# Patient Record
Sex: FEMALE | Race: BLACK OR AFRICAN AMERICAN | NOT HISPANIC OR LATINO
[De-identification: names, ages, dates, MRNs, and addresses within clinical notes are randomized per-mention and may not be internally consistent; named-entity substitution may affect disease eponyms.]

---

## 2020-01-29 ENCOUNTER — RESULT REVIEW (OUTPATIENT)
Age: 35
End: 2020-01-29

## 2021-03-30 ENCOUNTER — RESULT REVIEW (OUTPATIENT)
Age: 36
End: 2021-03-30

## 2021-07-26 ENCOUNTER — INPATIENT (INPATIENT)
Facility: HOSPITAL | Age: 36
LOS: 1 days | Discharge: HOME | End: 2021-07-28
Attending: HOSPITALIST | Admitting: HOSPITALIST
Payer: COMMERCIAL

## 2021-07-26 VITALS
TEMPERATURE: 98 F | SYSTOLIC BLOOD PRESSURE: 125 MMHG | RESPIRATION RATE: 20 BRPM | OXYGEN SATURATION: 100 % | DIASTOLIC BLOOD PRESSURE: 66 MMHG | HEART RATE: 84 BPM

## 2021-07-26 DIAGNOSIS — L03.211 CELLULITIS OF FACE: ICD-10-CM

## 2021-07-26 LAB
ALBUMIN SERPL ELPH-MCNC: 4.5 G/DL — SIGNIFICANT CHANGE UP (ref 3.3–5)
ALP SERPL-CCNC: 76 U/L — SIGNIFICANT CHANGE UP (ref 40–120)
ALT FLD-CCNC: 80 U/L — HIGH (ref 4–33)
ANION GAP SERPL CALC-SCNC: 17 MMOL/L — HIGH (ref 7–14)
AST SERPL-CCNC: 30 U/L — SIGNIFICANT CHANGE UP (ref 4–32)
BILIRUB SERPL-MCNC: 0.4 MG/DL — SIGNIFICANT CHANGE UP (ref 0.2–1.2)
BLD GP AB SCN SERPL QL: NEGATIVE — SIGNIFICANT CHANGE UP
BUN SERPL-MCNC: 5 MG/DL — LOW (ref 7–23)
CALCIUM SERPL-MCNC: 9.9 MG/DL — SIGNIFICANT CHANGE UP (ref 8.4–10.5)
CHLORIDE SERPL-SCNC: 98 MMOL/L — SIGNIFICANT CHANGE UP (ref 98–107)
CO2 SERPL-SCNC: 20 MMOL/L — LOW (ref 22–31)
CREAT SERPL-MCNC: 0.58 MG/DL — SIGNIFICANT CHANGE UP (ref 0.5–1.3)
GLUCOSE SERPL-MCNC: 102 MG/DL — HIGH (ref 70–99)
HCG SERPL-ACNC: SIGNIFICANT CHANGE UP MIU/ML
HCT VFR BLD CALC: 34.2 % — LOW (ref 34.5–45)
HGB BLD-MCNC: 11.6 G/DL — SIGNIFICANT CHANGE UP (ref 11.5–15.5)
MCHC RBC-ENTMCNC: 28.4 PG — SIGNIFICANT CHANGE UP (ref 27–34)
MCHC RBC-ENTMCNC: 33.9 GM/DL — SIGNIFICANT CHANGE UP (ref 32–36)
MCV RBC AUTO: 83.8 FL — SIGNIFICANT CHANGE UP (ref 80–100)
NRBC # BLD: 0 /100 WBCS — SIGNIFICANT CHANGE UP
NRBC # FLD: 0 K/UL — SIGNIFICANT CHANGE UP
PLATELET # BLD AUTO: 395 K/UL — SIGNIFICANT CHANGE UP (ref 150–400)
POTASSIUM SERPL-MCNC: 4.4 MMOL/L — SIGNIFICANT CHANGE UP (ref 3.5–5.3)
POTASSIUM SERPL-SCNC: 4.4 MMOL/L — SIGNIFICANT CHANGE UP (ref 3.5–5.3)
PROT SERPL-MCNC: 8.3 G/DL — SIGNIFICANT CHANGE UP (ref 6–8.3)
RBC # BLD: 4.08 M/UL — SIGNIFICANT CHANGE UP (ref 3.8–5.2)
RBC # FLD: 14.3 % — SIGNIFICANT CHANGE UP (ref 10.3–14.5)
RH IG SCN BLD-IMP: POSITIVE — SIGNIFICANT CHANGE UP
SODIUM SERPL-SCNC: 135 MMOL/L — SIGNIFICANT CHANGE UP (ref 135–145)
WBC # BLD: 10.98 K/UL — HIGH (ref 3.8–10.5)
WBC # FLD AUTO: 10.98 K/UL — HIGH (ref 3.8–10.5)

## 2021-07-26 PROCEDURE — 99285 EMERGENCY DEPT VISIT HI MDM: CPT

## 2021-07-26 PROCEDURE — 99223 1ST HOSP IP/OBS HIGH 75: CPT

## 2021-07-26 PROCEDURE — 70487 CT MAXILLOFACIAL W/DYE: CPT | Mod: 26

## 2021-07-26 RX ORDER — FOLIC ACID 0.8 MG
1 TABLET ORAL DAILY
Refills: 0 | Status: DISCONTINUED | OUTPATIENT
Start: 2021-07-26 | End: 2021-07-28

## 2021-07-26 RX ORDER — ACETAMINOPHEN 500 MG
1000 TABLET ORAL ONCE
Refills: 0 | Status: COMPLETED | OUTPATIENT
Start: 2021-07-26 | End: 2021-07-26

## 2021-07-26 RX ORDER — ACETAMINOPHEN 500 MG
650 TABLET ORAL EVERY 6 HOURS
Refills: 0 | Status: DISCONTINUED | OUTPATIENT
Start: 2021-07-26 | End: 2021-07-28

## 2021-07-26 RX ADMIN — Medication 100 MILLIGRAM(S): at 22:11

## 2021-07-26 RX ADMIN — Medication 400 MILLIGRAM(S): at 22:11

## 2021-07-26 NOTE — H&P ADULT - PROBLEM SELECTOR PLAN 2
Assessment:  - hx of pregnancy, planned, 5 weeks gestation  - has outpatient OBGYN appt planned today however cannot make it due to ED admission  - no hx or PE findings of sexually transmitted infections    Plan:  - will send HIV test, patient agreed  - will send UA to r/o asymptomatic bacteruria in the setting of pregnancy  - will obtain pelvic ultrasound to evaluate pregnancy status  - OBGYN consult for further recommendations  - will start folic acid and multivitamin for pregnancy status  - avoid teratogenic medications

## 2021-07-26 NOTE — H&P ADULT - PROBLEM SELECTOR PLAN 1
Assessment:  - patient with hx of dental procedure presented for new onset facial swelling for 1 week, concerning for abscess  - CT scan shows Extensive LEFT sided facial cellulitis with edema within the LEFT buccal fat, thickening of the LEFT platysma and masseter muscle, infected L molar  - WBc 10.98, mildly elevated  - patient currently nontoxic appearing, comfortable at bedside    Plan:  - OMFS surgery consult - no surgical intervention, start IV abx  - start IV clindamycin 600mg Q8hr. I spoke with pharmacy in regards to pregnancy status. Clindamycin is not a teratogenic and is safe for pregnancy. Patient is allergic to penicillins (gets a rash and hives as per patient)  - monitor for improvement Assessment:  - patient with hx of dental procedure presented for new onset facial swelling for 1 week, concerning for abscess  - CT scan shows Extensive LEFT sided facial cellulitis with edema within the LEFT buccal fat, thickening of the LEFT platysma and masseter muscle, infected L molar  - WBc 10.98, mildly elevated  - patient currently nontoxic appearing, comfortable at bedside    Plan:  - OMFS surgery consult - no surgical intervention, start IV abx  - start IV clindamycin 600mg Q8hr. I spoke with pharmacy in regards to pregnancy status. Clindamycin is not a teratogenic and is safe for pregnancy. Patient is allergic to penicillins (gets a rash and hives as per patient)  - monitor for improvement  - copy of the referral note from the outpatient dentist placed in the chart

## 2021-07-26 NOTE — H&P ADULT - ASSESSMENT
This is a 35 y/o F, 5 week pregnant, presented for facial cellulitis after dental procedure. Needs IV abx. Will admit for facial cellulitis

## 2021-07-26 NOTE — ED PROVIDER NOTE - OBJECTIVE STATEMENT
36 year old female presents with left facial swelling. patient had wisdom tooth removal on 7/14.  since then patient has developed left sided facial swelling. on clinda with no relief. also found to be newly pregnant. unable to open mouth. able to swallow liquids. no other complaints

## 2021-07-26 NOTE — ED ADULT NURSE NOTE - OBJECTIVE STATEMENT
37 y/o female presents to ED complaining of left sided dental abscess. Pt a&ox4, endorses getting 6 teeth pulled on 7/14, has been taking abx but swelling did not subside. Pt reports pain to L side of mouth and endorses going to dentist but was unable to get abscess drained due to pain, pt was unable to open her mouth. Denies difficulty breathing/swallowing. Denies fever, chills. 20g IV placed to LAC. Labs obtained and medicated as ordered. Will monitor.

## 2021-07-26 NOTE — H&P ADULT - HISTORY OF PRESENT ILLNESS
This is a 35 y/o F with pmhx of recent pregnancy (5 weeks) presented to the ED for L face swelling. The patient had tooth removal on the L side done on the 7/14/2021, then developed L facial swelling, pain and difficulty opening the mouth in the that area. Was given clindamycin after the procedure. The patient went to follow up with her outpatient dentist today and on exam, she found there was a possible submasseteric abscess for which it could not be drained in the office due to pain, and limited buccal space. The patient denies any symptoms of respiratory compromise. The patient is also 5 weeks pregnant, found out on the 17th, planned pregnancy. She otherwise denies fevers, chills, chest pain, abdominal pain. She denies dysuria, vaginal discharge. Denies difficulty swallowing however endorses difficulty eating due to pain and inability to open her mouth completely.

## 2021-07-26 NOTE — H&P ADULT - NSHPLABSRESULTS_GEN_ALL_CORE
11.6   10.98 )-----------( 395      ( 26 Jul 2021 17:48 )             34.2       07-26    135  |  98  |  5<L>  ----------------------------<  102<H>  4.4   |  20<L>  |  0.58    Ca    9.9      26 Jul 2021 17:48    TPro  8.3  /  Alb  4.5  /  TBili  0.4  /  DBili  x   /  AST  30  /  ALT  80<H>  /  AlkPhos  76  07-26

## 2021-07-26 NOTE — PROGRESS NOTE ADULT - SUBJECTIVE AND OBJECTIVE BOX
SUBJECTIVE:    37 y/o female 2 weeks s/p serial extractions presents to the ED with L sided facial swelling and chief complaint of swelling and trismus that has been developing for the past week. Patient is 5 weeks pregnant.    PAST MEDICAL & SURGICAL HISTORY: None reported    MEDICATIONS: None reported    Allergies: penicillins (Unknown)    CBC Full  -  ( 26 Jul 2021 17:48 )  WBC Count : 10.98 K/uL  RBC Count : 4.08 M/uL  Hemoglobin : 11.6 g/dL  Hematocrit : 34.2 %  Platelet Count - Automated : 395 K/uL  Mean Cell Volume : 83.8 fL  Mean Cell Hemoglobin : 28.4 pg  Mean Cell Hemoglobin Concentration : 33.9 gm/dL  Auto Neutrophil # : x  Auto Lymphocyte # : x  Auto Monocyte # : x  Auto Eosinophil # : x  Auto Basophil # : x  Auto Neutrophil % : x  Auto Lymphocyte % : x  Auto Monocyte % : x  Auto Eosinophil % : x  Auto Basophil % : x    07-26    135  |  98  |  5<L>  ----------------------------<  102<H>  4.4   |  20<L>  |  0.58    Ca    9.9      26 Jul 2021 17:48    TPro  8.3  /  Alb  4.5  /  TBili  0.4  /  DBili  x   /  AST  30  /  ALT  80<H>  /  AlkPhos  76  07-26    Radiology:    IMPRESSION:  Extensive LEFT sided facial cellulitis with edema within the LEFT buccal fat, thickening of the LEFT platysma and masseter muscle. Fluid seen in several empty sockets within the mandible and maxilla. There is breakthrough of lateral LEFT mandibular cortex about the socket of the first LEFT mandibular molar which may be infected. No overlying abscess is seen. Reactive level 1 lymph nodes are noted.    OBJECTIVE:    Physical exam:  Gen: AAOx4. NAD. Patient experiencing no dyspnea, dysphagia.  Head: NC, asymmetric with L lower1/3 facial edema. Inferior border of the mandible palpable on right, not palpable on left.   Eyes: PERRLA, EOMI.  Ears: EACs patent w/o discharge.  Nose: Nares patent w/o discharge.  Neck: Right side soft and supple, left side tender to palpation   IOE: Patient experiencing significant trismus with VANCE of ~15 mm. Firm swelling in the buccal vestibule extending to the buccal mucosa. FOM soft and not raised.  Neuro: CN II-XII grossly intact

## 2021-07-26 NOTE — H&P ADULT - NSHPPHYSICALEXAM_GEN_ALL_CORE
PHYSICAL EXAM:  GENERAL: NAD, well-developed, well-nourished  HEAD:  Atraumatic, Normocephalic  EYES: EOMI, PERRL, conjunctiva and sclera clear  NECK: Supple, No JVD, + facial swelling on the L side, swelling of the buccal area with fluctuance  CHEST/LUNG: Clear to auscultation bilaterally; No wheezes, rales or rhonchi  HEART: Regular rate and rhythm; No murmurs, rubs, or gallops, (+)S1, S2  ABDOMEN: Soft, Nontender, Nondistended; Normal Bowel sounds   EXTREMITIES:  2+ Peripheral Pulses, No clubbing, cyanosis, or edema  PSYCH: normal mood and affect  NEUROLOGY: AAOx3, non-focal  SKIN: No rashes or lesions

## 2021-07-26 NOTE — PROGRESS NOTE ADULT - ASSESSMENT
35 y/o patient presents with left sided facial swelling 2 weeks s/p extractions. No acute abscess noted on CT.    Recommendations:   - No surgical intervention from OMFS standpoint   - IV clindamycin and monitor overnight  - Pain control per primary  - Follow up with external oral surgeon    Oral and Maxillofacial Surgery   [id number]

## 2021-07-26 NOTE — H&P ADULT - NSHPREVIEWOFSYSTEMS_GEN_ALL_CORE
REVIEW OF SYSTEMS:    CONSTITUTIONAL: No weakness, fevers or chills  EYES/ENT: No visual changes;  No dysphagia; No sore throat; No rhinorrhea; No sinus pain/pressure, + facial pain and swelling on the L  NECK: No pain or stiffness  RESPIRATORY: No cough, wheezing, hemoptysis; No shortness of breath  CARDIOVASCULAR: No chest pain or palpitations; No lower extremity edema  GASTROINTESTINAL: No abdominal or epigastric pain. No nausea, vomiting, or hematemesis; No diarrhea or constipation. No melena or hematochezia.  GENITOURINARY: No dysuria, frequency or hematuria  NEUROLOGICAL: No numbness or weakness  MSK: ambulates without assistance  SKIN: No itching, burning, rashes, or lesions   All other review of systems is negative unless indicated above.

## 2021-07-26 NOTE — ED PROVIDER NOTE - ENMT, MLM
Airway patent, Nasal mucosa clear. Mouth with normal mucosa. left sided facial swelling. unable to open mouth to complete exam

## 2021-07-27 ENCOUNTER — TRANSCRIPTION ENCOUNTER (OUTPATIENT)
Age: 36
End: 2021-07-27

## 2021-07-27 DIAGNOSIS — Z29.9 ENCOUNTER FOR PROPHYLACTIC MEASURES, UNSPECIFIED: ICD-10-CM

## 2021-07-27 DIAGNOSIS — Z3A.01 LESS THAN 8 WEEKS GESTATION OF PREGNANCY: ICD-10-CM

## 2021-07-27 DIAGNOSIS — L03.211 CELLULITIS OF FACE: ICD-10-CM

## 2021-07-27 DIAGNOSIS — Z34.90 ENCOUNTER FOR SUPERVISION OF NORMAL PREGNANCY, UNSPECIFIED, UNSPECIFIED TRIMESTER: ICD-10-CM

## 2021-07-27 LAB
ALBUMIN SERPL ELPH-MCNC: 4.3 G/DL — SIGNIFICANT CHANGE UP (ref 3.3–5)
ALP SERPL-CCNC: 77 U/L — SIGNIFICANT CHANGE UP (ref 40–120)
ALT FLD-CCNC: 70 U/L — HIGH (ref 4–33)
ANION GAP SERPL CALC-SCNC: 12 MMOL/L — SIGNIFICANT CHANGE UP (ref 7–14)
AST SERPL-CCNC: 22 U/L — SIGNIFICANT CHANGE UP (ref 4–32)
BASOPHILS # BLD AUTO: 0.02 K/UL — SIGNIFICANT CHANGE UP (ref 0–0.2)
BASOPHILS NFR BLD AUTO: 0.2 % — SIGNIFICANT CHANGE UP (ref 0–2)
BILIRUB SERPL-MCNC: 0.6 MG/DL — SIGNIFICANT CHANGE UP (ref 0.2–1.2)
BUN SERPL-MCNC: 4 MG/DL — LOW (ref 7–23)
CALCIUM SERPL-MCNC: 9.5 MG/DL — SIGNIFICANT CHANGE UP (ref 8.4–10.5)
CHLORIDE SERPL-SCNC: 103 MMOL/L — SIGNIFICANT CHANGE UP (ref 98–107)
CO2 SERPL-SCNC: 20 MMOL/L — LOW (ref 22–31)
CREAT SERPL-MCNC: 0.53 MG/DL — SIGNIFICANT CHANGE UP (ref 0.5–1.3)
EOSINOPHIL # BLD AUTO: 0.1 K/UL — SIGNIFICANT CHANGE UP (ref 0–0.5)
EOSINOPHIL NFR BLD AUTO: 1.1 % — SIGNIFICANT CHANGE UP (ref 0–6)
GLUCOSE SERPL-MCNC: 100 MG/DL — HIGH (ref 70–99)
HCT VFR BLD CALC: 30.4 % — LOW (ref 34.5–45)
HGB BLD-MCNC: 10.5 G/DL — LOW (ref 11.5–15.5)
HIV 1+2 AB+HIV1 P24 AG SERPL QL IA: SIGNIFICANT CHANGE UP
IANC: 6.81 K/UL — SIGNIFICANT CHANGE UP (ref 1.5–8.5)
IMM GRANULOCYTES NFR BLD AUTO: 0.4 % — SIGNIFICANT CHANGE UP (ref 0–1.5)
LYMPHOCYTES # BLD AUTO: 1.06 K/UL — SIGNIFICANT CHANGE UP (ref 1–3.3)
LYMPHOCYTES # BLD AUTO: 11.6 % — LOW (ref 13–44)
MAGNESIUM SERPL-MCNC: 2.1 MG/DL — SIGNIFICANT CHANGE UP (ref 1.6–2.6)
MCHC RBC-ENTMCNC: 28.8 PG — SIGNIFICANT CHANGE UP (ref 27–34)
MCHC RBC-ENTMCNC: 34.5 GM/DL — SIGNIFICANT CHANGE UP (ref 32–36)
MCV RBC AUTO: 83.3 FL — SIGNIFICANT CHANGE UP (ref 80–100)
MONOCYTES # BLD AUTO: 1.09 K/UL — HIGH (ref 0–0.9)
MONOCYTES NFR BLD AUTO: 12 % — SIGNIFICANT CHANGE UP (ref 2–14)
NEUTROPHILS # BLD AUTO: 6.81 K/UL — SIGNIFICANT CHANGE UP (ref 1.8–7.4)
NEUTROPHILS NFR BLD AUTO: 74.7 % — SIGNIFICANT CHANGE UP (ref 43–77)
NRBC # BLD: 0 /100 WBCS — SIGNIFICANT CHANGE UP
NRBC # FLD: 0 K/UL — SIGNIFICANT CHANGE UP
PHOSPHATE SERPL-MCNC: 3.2 MG/DL — SIGNIFICANT CHANGE UP (ref 2.5–4.5)
PLATELET # BLD AUTO: 376 K/UL — SIGNIFICANT CHANGE UP (ref 150–400)
POTASSIUM SERPL-MCNC: 3.9 MMOL/L — SIGNIFICANT CHANGE UP (ref 3.5–5.3)
POTASSIUM SERPL-SCNC: 3.9 MMOL/L — SIGNIFICANT CHANGE UP (ref 3.5–5.3)
PROT SERPL-MCNC: 8.1 G/DL — SIGNIFICANT CHANGE UP (ref 6–8.3)
RBC # BLD: 3.65 M/UL — LOW (ref 3.8–5.2)
RBC # FLD: 14.2 % — SIGNIFICANT CHANGE UP (ref 10.3–14.5)
SARS-COV-2 RNA SPEC QL NAA+PROBE: SIGNIFICANT CHANGE UP
SODIUM SERPL-SCNC: 135 MMOL/L — SIGNIFICANT CHANGE UP (ref 135–145)
WBC # BLD: 9.12 K/UL — SIGNIFICANT CHANGE UP (ref 3.8–10.5)
WBC # FLD AUTO: 9.12 K/UL — SIGNIFICANT CHANGE UP (ref 3.8–10.5)

## 2021-07-27 PROCEDURE — 76830 TRANSVAGINAL US NON-OB: CPT | Mod: 26

## 2021-07-27 PROCEDURE — 99233 SBSQ HOSP IP/OBS HIGH 50: CPT | Mod: GC

## 2021-07-27 RX ORDER — DIPHENHYDRAMINE HYDROCHLORIDE AND LIDOCAINE HYDROCHLORIDE AND ALUMINUM HYDROXIDE AND MAGNESIUM HYDRO
15 KIT
Refills: 0 | Status: DISCONTINUED | OUTPATIENT
Start: 2021-07-27 | End: 2021-07-28

## 2021-07-27 RX ORDER — LACTOBACILLUS ACIDOPHILUS 100MM CELL
1 CAPSULE ORAL DAILY
Refills: 0 | Status: DISCONTINUED | OUTPATIENT
Start: 2021-07-27 | End: 2021-07-28

## 2021-07-27 RX ADMIN — Medication 1 TABLET(S): at 12:15

## 2021-07-27 RX ADMIN — Medication 100 MILLIGRAM(S): at 21:57

## 2021-07-27 RX ADMIN — Medication 100 MILLIGRAM(S): at 15:25

## 2021-07-27 RX ADMIN — DIPHENHYDRAMINE HYDROCHLORIDE AND LIDOCAINE HYDROCHLORIDE AND ALUMINUM HYDROXIDE AND MAGNESIUM HYDRO 15 MILLILITER(S): KIT at 12:15

## 2021-07-27 RX ADMIN — Medication 650 MILLIGRAM(S): at 09:26

## 2021-07-27 RX ADMIN — Medication 1 MILLIGRAM(S): at 12:15

## 2021-07-27 RX ADMIN — Medication 650 MILLIGRAM(S): at 11:38

## 2021-07-27 RX ADMIN — Medication 650 MILLIGRAM(S): at 00:41

## 2021-07-27 RX ADMIN — Medication 100 MILLIGRAM(S): at 05:01

## 2021-07-27 RX ADMIN — DIPHENHYDRAMINE HYDROCHLORIDE AND LIDOCAINE HYDROCHLORIDE AND ALUMINUM HYDROXIDE AND MAGNESIUM HYDRO 15 MILLILITER(S): KIT at 18:56

## 2021-07-27 RX ADMIN — Medication 650 MILLIGRAM(S): at 02:25

## 2021-07-27 RX ADMIN — DIPHENHYDRAMINE HYDROCHLORIDE AND LIDOCAINE HYDROCHLORIDE AND ALUMINUM HYDROXIDE AND MAGNESIUM HYDRO 15 MILLILITER(S): KIT at 21:57

## 2021-07-27 NOTE — DISCHARGE NOTE PROVIDER - PROVIDER TOKENS
FREE:[LAST:[Roman],FIRST:[Yaima],PHONE:[(   )    -],FAX:[(   )    -],ADDRESS:[[facility] at [address] [place] [address] on Monday 08/02/21 at 7:45 pm]],FREE:[LAST:[Janine],FIRST:[Neli],PHONE:[(   )    -],FAX:[(   )    -],ADDRESS:[[facility] [place] [address] on Friday 7/30/21 at 9:00 am.]]

## 2021-07-27 NOTE — DISCHARGE NOTE PROVIDER - CARE PROVIDER_API CALL
Yaima Owens at [address], [place] [address] on Monday 08/02/21 at 7:45 pm  Phone: (   )    -  Fax: (   )    -  Follow Up Time:     Neli Mehta  [address] [place] [address] on Friday 7/30/21 at 9:00 am.  Phone: (   )    -  Fax: (   )    -  Follow Up Time:

## 2021-07-27 NOTE — DISCHARGE NOTE PROVIDER - NSDCCPCAREPLAN_GEN_ALL_CORE_FT
PRINCIPAL DISCHARGE DIAGNOSIS  Diagnosis: Facial cellulitis  Assessment and Plan of Treatment: When you came to the hospital, you were complaining of left facial swelling and pain. You were found to have cellulitis, which is a common infection of the skin that can occur in many areas on the body, including the face. You were treated with IV antibiotics which were noted to decrease the swelling and help alleviate your pain. Your pain was also well controlled with Tylenol and Magic Mouthwash. NSAIDs were avoided given that you are pregnant, and NSAIDs can harm developing babies. Please take Clindamycin 450mg three times a day until completion of the course of antibiotics.   We recommend you take tylenol 650mg every 6 hours as needed for pain. Please follow-up with your Oral Surgeon, Dr. Mehta, on Friday 07/30/21 at 9:00am.   Return to the Emergency Department if you experience recurrent vomiting, fevers greater than 100.4F, increase in area of redness, warmth around the area, foul smelling discharge from the area, increased tenderness around the area, or any other concerning symptoms. Also, if you experience worsening watery diarrhea, please seek out medical care immediately.         SECONDARY DISCHARGE DIAGNOSES  Diagnosis: Currently pregnant  Assessment and Plan of Treatment: When you came to the hospital, you had informed the physicians that you were pregnant. An ultrasound performed in the hospital noted an intrauterine gestation of 6 weeks and 2 days on 07/27/21. The full results of this ultrasound are documented later in this document. You were not given any medications that could harm your developing baby. Please avoid NSAIDs, such as Advil or Aleve, as these can harm a developing baby. Please continue to take your vitamins. Follow-up with your OBGYN on 08/02/21 at 7:45pm. If you experience worsening abdominal pain or vaginal bleeding, please seek medical care immediately.

## 2021-07-27 NOTE — PROGRESS NOTE ADULT - ATTENDING COMMENTS
36yr old woman currently 5 weeks pregnant with no significant PMH p/w left face swelling and pain after dental extraction. Admitted with left facial cellulitis.    left face with induration and swelling with mild TTP  Patient unable to open mouth more than 2 cm, no gross abnormalities seen    CT Maxillofacial with no abscess on imaging. No need for any intervention per OMFS  C/w IV Clindamycin, Tylenol PRN for pain control, ice pack to left face  F/U US TV  Switch to soft diet

## 2021-07-27 NOTE — PROGRESS NOTE ADULT - SUBJECTIVE AND OBJECTIVE BOX
Siddhartha Wilkerson MS4  Internal Medicine  Pager #[phone]    Patient is a 36y old  Female who presents with a chief complaint of Left facial swelling (26 Jul 2021 20:59)      SUBJECTIVE / OVERNIGHT EVENTS:  Patient seen and evaluated in ED. No acute events overnight. Complains of dull 6/10 L sided facial pain in mandibular region, becomes 8/10 when opening jaw; although significantly improved following 650mg oral Tylenol. No vaginal discharge/bleeding. No fevers/chills. Denies SOB at rest, chest pain, palpitations, abdominal pain, nausea/vomiting. Denies difficulty swallowing.     ADDITIONAL REVIEW OF SYSTEMS:    CONSTITUTIONAL: No weakness, fevers or chills  EYES/ENT: + L sided facial swelling in mandibular region. Worsened by opening jaw. No visual changes;  No vertigo or throat pain.  NECK: No pain or stiffness.  RESPIRATORY: No cough, wheezing, hemoptysis; No shortness of breath.  CARDIOVASCULAR: No chest pain or palpitations  GASTROINTESTINAL: + constipation. no abdominal pain. No nausea, vomiting, diarrhea, or melena.  GENITOURINARY: No dysuria, frequency or hematuria  NEUROLOGICAL: No numbness or weakness  All other review of systems is negative unless indicated above.    MEDICATIONS  (STANDING):  clindamycin IVPB 600 milliGRAM(s) IV Intermittent every 8 hours  FIRST- Mouthwash  BLM 15 milliLiter(s) Swish and Spit four times a day  folic acid 1 milliGRAM(s) Oral daily  lactobacillus acidophilus 1 Tablet(s) Oral daily  multivitamin 1 Tablet(s) Oral daily    MEDICATIONS  (PRN):  acetaminophen   Tablet .. 650 milliGRAM(s) Oral every 6 hours PRN Mild Pain (1 - 3)      CAPILLARY BLOOD GLUCOSE        I&O's Summary      PHYSICAL EXAM:  Vital Signs Last 24 Hrs  T(C): 36.9 (27 Jul 2021 05:03), Max: 37.4 (26 Jul 2021 23:20)  T(F): 98.5 (27 Jul 2021 05:03), Max: 99.3 (26 Jul 2021 23:20)  HR: 80 (27 Jul 2021 10:27) (80 - 95)  BP: 124/70 (27 Jul 2021 10:27) (120/66 - 125/83)  BP(mean): --  RR: 18 (27 Jul 2021 10:27) (17 - 20)  SpO2: 100% (27 Jul 2021 10:27) (100% - 100%)    CONSTITUTIONAL: NAD, well-developed  HEENT: NC, asymmetric with L lower 1/3 facial edema. L lower 1/3 face tender to palpation, indurated, with no palpable fluid collections. Maximum opening of mouth ~15 mm. No pus noted on gross inspection of oropharynx.   RESPIRATORY: Normal respiratory effort; lungs are clear to auscultation bilaterally  CARDIOVASCULAR: Regular rate, normal S1 and S2, no murmur/rub/gallop; No lower extremity edema; Peripheral pulses are 2+ bilaterally  ABDOMEN: Nontender to palpation, normoactive bowel sounds, no rebound/guarding; No hepatosplenomegaly  MSK: no cyanosis of digits; no joint swelling or tenderness to palpation, full strength all extremities.  NEURO: No focal deficits, CN II-XII grossly intact b/l; sensation to light touch intact in all extremities.   PSYCH: A+O to person, place, and time; affect appropriate.    LABS:                        10.5   9.12  )-----------( 376      ( 27 Jul 2021 07:10 )             30.4     07-27    135  |  103  |  4<L>  ----------------------------<  100<H>  3.9   |  20<L>  |  0.53    Ca    9.5      27 Jul 2021 07:10  Phos  3.2     07-27  Mg     2.10     07-27    TPro  8.1  /  Alb  4.3  /  TBili  0.6  /  DBili  x   /  AST  22  /  ALT  70<H>  /  AlkPhos  77  07-27    HCG Quantitative, Serum: 97842.0:     HIV-1/2 Combo Result: Nonreact  (07.27.21 @ 07:10)      RADIOLOGY & ADDITIONAL TESTS:  Results Reviewed:   Imaging Personally Reviewed:   < from: CT Maxillofacial w/ IV Cont (07.26.21 @ 19:43) >  IMPRESSION:  Extensive LEFT sided facial cellulitis with edema within the LEFT buccal fat, thickening of the LEFT platysma and masseter muscle. Fluid seen in several empty sockets within the mandible and maxilla. There is breakthrough of lateral LEFT mandibular cortex about the socket of the first LEFT mandibularmolar which may be infected. No overlying abscess is seen. Reactive level 1 lymph nodes are noted.    < end of copied text >    COORDINATION OF CARE:  Care Discussed with Consultants/Other Providers [Y/N]: y  Prior or Outpatient Records Reviewed [Y/N]: y

## 2021-07-27 NOTE — DISCHARGE NOTE PROVIDER - HOSPITAL COURSE
This is a 37 y/o F with pmhx of recent pregnancy (5 weeks) presented to the ED for L face swelling. The patient had tooth removal on the L side done on the 7/14/2021, then developed L facial swelling, pain and difficulty opening the mouth in the that area. Was given clindamycin after the procedure. The patient went to follow up with her outpatient dentist today and on exam, she found there was a possible submasseteric abscess for which it could not be drained in the office due to pain, and limited buccal space. The patient denies any symptoms of respiratory compromise. The patient is also 5 weeks pregnant, found out on the 17th, planned pregnancy. She otherwise denies fevers, chills, chest pain, abdominal pain. She denies dysuria, vaginal discharge. Denies difficulty swallowing however endorses difficulty eating due to pain and inability to open her mouth completely.    In the ED - VS: T 36.7, /66, HR 84, RR 20, SpO2 100% on RA.  CT OMFS IV con revealed Extensive LEFT sided facial cellulitis with edema within the LEFT buccal fat, thickening of the LEFT platysma and masseter muscle. Fluid seen in several empty sockets within the mandible and maxilla. There is breakthrough of lateral LEFT mandibular cortex about the socket of the first LEFT mandibularmolar which may be infected. No overlying abscess is seen. Reactive level 1 lymph nodes are noted.  OMFS consulted      This is a 37 y/o F with pmhx of recent pregnancy (5 weeks) presented to the ED for L face swelling. The patient had tooth removal on the L side done on the 7/14/2021, then developed L facial swelling, pain and difficulty opening the mouth in the that area. Was given clindamycin after the procedure. The patient went to follow up with her outpatient dentist today and on exam, she found there was a possible submasseteric abscess for which it could not be drained in the office due to pain, and limited buccal space. The patient denies any symptoms of respiratory compromise. The patient is also 5 weeks pregnant, found out on the 17th, planned pregnancy. She otherwise denies fevers, chills, chest pain, abdominal pain. She denies dysuria, vaginal discharge. Denies difficulty swallowing however endorses difficulty eating due to pain and inability to open her mouth completely.    In the ED - VS: T 36.7, /66, HR 84, RR 20, SpO2 100% on RA.  CT OMFS IV contrast revealed Extensive LEFT sided facial cellulitis with edema, thickening of the LEFT platysma and masseter muscle. Breakthrough of lateral LEFT mandibular cortex about the socket of the first LEFT mandibularmolar which may be infected. No evidence of overlying abscess is seen.     Hospital Course:  Patient was in no acute distress, able to tolerate solids and liquids, and no signs of respiratory compromise. OMFS consulted in ED, no surgical intervention warranted at that time, and IV antibiotics were recommended. As patient has history of penicillin allergy, IV clindamycin 600mg q8hr was started,  OMFS consulted      This is a 37 y/o F with pmhx of recent pregnancy (5 weeks) presented to the ED for L face swelling. The patient had tooth removal on the L side done on the 7/14/2021, then developed L facial swelling, pain and difficulty opening the mouth in the that area. Was given clindamycin after the procedure. The patient went to follow up with her outpatient dentist today and on exam, she found there was a possible submasseteric abscess for which it could not be drained in the office due to pain, and limited buccal space. The patient denies any symptoms of respiratory compromise. The patient is also 5 weeks pregnant, found out on the 17th, planned pregnancy. She otherwise denies fevers, chills, chest pain, abdominal pain. She denies dysuria, vaginal discharge. Denies difficulty swallowing however endorses difficulty eating due to pain and inability to open her mouth completely.    In the ED - VS: T 36.7, /66, HR 84, RR 20, SpO2 100% on RA.  CT OMFS IV contrast revealed Extensive LEFT sided facial cellulitis with edema, thickening of the LEFT platysma and masseter muscle. Breakthrough of lateral LEFT mandibular cortex about the socket of the first LEFT mandibularmolar which may be infected. No evidence of overlying abscess is seen.     Hospital Course:  Patient was in no acute distress, able to tolerate solids and liquids, and no signs of respiratory compromise. OMFS consulted in ED, and after reviewing CT scan, no surgical intervention warranted at that time, and IV antibiotics were recommended. As patient has history of penicillin allergy, IV clindamycin 600mg q8hr was started. Pain was managed with oral tylenol and magic mouthwash. NSAIDs and opioids were avoided as patient stated she was pregnant. [place] [address], and transvaginal ultrasound performed on 7/27 noted intrauterine gestation at 6 weeks 2 days, vaguely discernible yolk sac, and no fetal pole, possibly due to early stage of gestation. Patient's swelling and pain quickly improved on IV clindamycin, and tenderness to palpation as well as induration also resolved signficiantly. Patient's jaw opening was still signficantly limited, with no discernable improvement on IV antibiotics. Patient was instructed on technqiues to slowly open her jaw by gently placing pressure with two of her fingers, and that the trismus may take weeks to resolve. At no point during the hospitalization did patient have any signs of respiratory distress, and patient's infection did not appear to be systemic, given WBC within normal limits and lack of fever. At the time of discharge, patient was hemodynamically stable        This is a 35 y/o F with pmhx of recent pregnancy (5 weeks) presented to the ED for L face swelling. The patient had tooth removal on the L side done on the 7/14/2021, then developed L facial swelling, pain and difficulty opening the mouth in the that area. Was given clindamycin after the procedure. The patient went to follow up with her outpatient dentist today and on exam, she found there was a possible submasseteric abscess for which it could not be drained in the office due to pain, and limited buccal space. The patient denies any symptoms of respiratory compromise. The patient is also 5 weeks pregnant, found out on the 17th, planned pregnancy. She otherwise denies fevers, chills, chest pain, abdominal pain. She denies dysuria, vaginal discharge. Denies difficulty swallowing however endorses difficulty eating due to pain and inability to open her mouth completely.    In the ED - VS: T 36.7, /66, HR 84, RR 20, SpO2 100% on RA.  CT OMFS IV contrast revealed Extensive LEFT sided facial cellulitis with edema, thickening of the LEFT platysma and masseter muscle. Breakthrough of lateral LEFT mandibular cortex about the socket of the first LEFT mandibularmolar which may be infected. No evidence of overlying abscess was seen.     Hospital Course:  Patient was in no acute distress, able to tolerate solids and liquids, and showed no signs of respiratory compromise. OMFS consulted in ED, and after reviewing CT scan, no surgical intervention warranted at that time. As patient has history of penicillin allergy, IV clindamycin 600mg q8hr was started. Pain was managed with oral tylenol and magic mouthwash. NSAIDs and opioids were avoided as patient stated she was pregnant. Serum hCG [place] be [address], and transvaginal ultrasound performed on 7/27 noted intrauterine gestation at 6 weeks 2 days, vaguely discernible yolk sac, and no fetal pole, possibly due to early stage of gestation. Patient's swelling and pain quickly improved on IV clindamycin, and tenderness to palpation as well as induration also resolved signficiantly. Patient's jaw opening was remained limited, with no discernable improvement on IV antibiotics. Patient was instructed on technqiues to slowly open her jaw by gently placing pressure with two of her fingers, and that the trismus may take weeks to resolve. At no point during the hospitalization did patient have any signs of respiratory distress, and patient's infection did not appear to be systemic, given WBC within normal limits and lack of fever. At the time of discharge she was hemodynamically stable and in no acute distress. She was agreeable with plan for discharge and outpatient follow up. [facility]artha ( [facility] discussed strict return precautions with the patient and she voiced understanding.        This is a 37 y/o F with pmhx of recent pregnancy (5 weeks) presented to the ED for L face swelling. The patient had tooth removal on the L side done on the 7/14/2021, then developed L facial swelling, pain and difficulty opening the mouth in the that area. Was given clindamycin after the procedure. The patient went to follow up with her outpatient dentist today and on exam, she found there was a possible submasseteric abscess for which it could not be drained in the office due to pain, and limited buccal space. The patient denies any symptoms of respiratory compromise. The patient is also 5 weeks pregnant, found out on the 17th, planned pregnancy. She otherwise denies fevers, chills, chest pain, abdominal pain. She denies dysuria, vaginal discharge. Denies difficulty swallowing however endorses difficulty eating due to pain and inability to open her mouth completely.    In the ED - VS: T 36.7, /66, HR 84, RR 20, SpO2 100% on RA.  CT OMFS IV contrast revealed Extensive LEFT sided facial cellulitis with edema, thickening of the LEFT platysma and masseter muscle. Breakthrough of lateral LEFT mandibular cortex about the socket of the first LEFT mandibularmolar which may be infected. No evidence of overlying abscess was seen.     Hospital Course:  Patient was in no acute distress, able to tolerate solids and liquids, and showed no signs of respiratory compromise. OMFS consulted in ED, and after reviewing CT scan, no surgical intervention warranted at that time. As patient has history of penicillin allergy, IV clindamycin 600mg q8hr was started. Pain was managed with oral tylenol and magic mouthwash. NSAIDs and opioids were avoided as patient stated she was pregnant. Serum hCG [place] be [address], and transvaginal ultrasound performed on 7/27 noted intrauterine gestation at 6 weeks 2 days, vaguely discernible yolk sac, and no fetal pole, possibly due to early stage of gestation. Patient's swelling and pain quickly improved on IV clindamycin, and tenderness to palpation as well as induration also resolved signficiantly. Patient's jaw opening was remained limited, with no discernable improvement on IV antibiotics. Patient was instructed on technqiues to slowly open her jaw by gently placing pressure with two of her fingers, and that the trismus may take weeks to resolve. At no point during the hospitalization did patient have any signs of respiratory distress, and patient's infection did not appear to be systemic, given WBC within normal limits and lack of fever. At the time of discharge she was hemodynamically stable and in no acute distress. She was agreeable with plan for discharge and outpatient follow up. [facility] discussed strict return precautions with the patient and she voiced understanding.

## 2021-07-27 NOTE — PROGRESS NOTE ADULT - ASSESSMENT
36F with no PMHx, currently 5 weeks pregnant as per LMP, presented for facial cellulitis after dental procedure. No abscess on imaging, started on IV Clindamycin.

## 2021-07-27 NOTE — DISCHARGE NOTE PROVIDER - NSDCMRMEDTOKEN_GEN_ALL_CORE_FT
acetaminophen 325 mg oral tablet: 2 tab(s) orally every 6 hours, As needed, Mild Pain (1 - 3)  clindamycin 150 mg oral capsule: 3 cap(s) orally 3 times a day  diphenhydramine/lidocaine/aluminum hydroxide/magnesium hydroxide/simethicone mucous membrane suspension: 15 milliliter(s) mucous membrane 4 times a day, As Needed  Swash and Spit  folic acid 1 mg oral tablet: 1 tab(s) orally once a day  lactobacillus acidophilus oral capsule: 1 tab(s) orally once a day  Multiple Vitamins oral tablet: 1 tab(s) orally once a day   acetaminophen 325 mg oral tablet: 2 tab(s) orally every 6 hours, As needed, Mild Pain (1 - 3)  clindamycin 150 mg oral capsule: 3 cap(s) orally 3 times a day; take first dose tonight, then until 8/2/2021 or when finish.   diphenhydramine/lidocaine/aluminum hydroxide/magnesium hydroxide/simethicone mucous membrane suspension: 15 milliliter(s) mucous membrane 4 times a day, As Needed  Swash and Spit  folic acid 1 mg oral tablet: 1 tab(s) orally once a day  lactobacillus acidophilus oral capsule: 1 tab(s) orally once a day  Multiple Vitamins oral tablet: 1 tab(s) orally once a day

## 2021-07-27 NOTE — PATIENT PROFILE ADULT - INTERNATIONAL TRAVEL
No Pt with typical sickle cell crisis pain, hip and back pain, afebrile, no resp distress, lungs clear.  Plan for pain control with PO dilaudid, motrin, PO hydration. DC when feels better.

## 2021-07-27 NOTE — DISCHARGE NOTE PROVIDER - NSDCFUADDAPPT_GEN_ALL_CORE_FT
[facility] at [address] [place] [address] on Monday 08/02/21 at 7:45 pm with Dr. Yaima Owens  Please follow up with Dr. Yaima Owens at [facility] at [address] [place] [address] on Monday 08/02/21 at 7:45 pm     Please follow up with Dr. Neli Mehta at [address] [place] [address] on Friday 7/30/21 at 9:00 am.

## 2021-07-27 NOTE — DISCHARGE NOTE PROVIDER - NSDCCPTREATMENT_GEN_ALL_CORE_FT
PRINCIPAL PROCEDURE  Procedure: CT maxillofacial area  Findings and Treatment: FINDINGS:  There is no facial or orbital fracture. The globes are intact without retrobulbar hematoma. The lenses are located bilaterally.  The mandibular condyles are located without fracture. The temporomandibular joints are intact.  Bilateral maxillary sinus mucosal thickening. The nasopharyngeal contours are unremarkable.  Extensive LEFT sided facial cellulitis with edema within the LEFT buccal fat, thickening of the LEFT platysma and masseter muscle. Fluid seen in several empty sockets within the mandible and maxilla. There is breakthrough of lateral LEFT mandibular cortex about the socket of the first LEFT mandibular molar which may be infected. No overlying abscess is seen. Reactive level 1 lymph nodes are noted.  IMPRESSION:  Extensive LEFT sided facial cellulitis with edema within the LEFT buccal fat, thickening of the LEFT platysma and masseter muscle. Fluid seen in several empty sockets within the mandible and maxilla. There is breakthrough of lateral LEFT mandibular cortex about the socket of the first LEFT mandibularmolar which may be infected. No overlying abscess is seen. Reactive level 1 lymph nodes are noted.< from: CT Maxillofacial w/ IV Cont (07.26.21 @ 19:43) >        SECONDARY PROCEDURE  Procedure: US transvaginal  Findings and Treatment: FINDINGS:  Uterus/Endometrium: Anteverted uterus demonstrating an early intrauterine gestation with a mean sac diameter of 14.8 mm corresponding with a gestational age of 6 weeks 2 days. A yolk sac is vaguely discernible. No fetal pole is seen, possibly due to the early stage of gestation.  Right ovary: 4.7 cm x 2.6 cm x 3.3 cm. Within normal limits. Right corpus luteum measures 2.4 cm.  Left ovary: 2.1 cm x 1.4 cm x 1.8 cm. Within normal limits.  Fluid: None.  IMPRESSION:  Early intrauterine gestation at 6 weeks 2 days based on today's mean sac diameter. A yolk sac is vaguely discernible. No fetal pole is identified, possibly due to the early stage of gestation. Follow-up serum quantitative beta-hCG. Repeat ultrasound as clinically indicated< from: US Transvaginal (07.27.21 @ 14:25) >

## 2021-07-28 ENCOUNTER — TRANSCRIPTION ENCOUNTER (OUTPATIENT)
Age: 36
End: 2021-07-28

## 2021-07-28 VITALS
OXYGEN SATURATION: 99 % | HEART RATE: 70 BPM | DIASTOLIC BLOOD PRESSURE: 70 MMHG | SYSTOLIC BLOOD PRESSURE: 110 MMHG | RESPIRATION RATE: 18 BRPM | TEMPERATURE: 98 F

## 2021-07-28 LAB
ALBUMIN SERPL ELPH-MCNC: 3.8 G/DL — SIGNIFICANT CHANGE UP (ref 3.3–5)
ALP SERPL-CCNC: 70 U/L — SIGNIFICANT CHANGE UP (ref 40–120)
ALT FLD-CCNC: 55 U/L — HIGH (ref 4–33)
ANION GAP SERPL CALC-SCNC: 14 MMOL/L — SIGNIFICANT CHANGE UP (ref 7–14)
AST SERPL-CCNC: 21 U/L — SIGNIFICANT CHANGE UP (ref 4–32)
BASOPHILS # BLD AUTO: 0.02 K/UL — SIGNIFICANT CHANGE UP (ref 0–0.2)
BASOPHILS NFR BLD AUTO: 0.3 % — SIGNIFICANT CHANGE UP (ref 0–2)
BILIRUB SERPL-MCNC: 0.4 MG/DL — SIGNIFICANT CHANGE UP (ref 0.2–1.2)
BUN SERPL-MCNC: 6 MG/DL — LOW (ref 7–23)
CALCIUM SERPL-MCNC: 9.3 MG/DL — SIGNIFICANT CHANGE UP (ref 8.4–10.5)
CHLORIDE SERPL-SCNC: 102 MMOL/L — SIGNIFICANT CHANGE UP (ref 98–107)
CO2 SERPL-SCNC: 19 MMOL/L — LOW (ref 22–31)
COVID-19 SPIKE DOMAIN AB INTERP: POSITIVE
COVID-19 SPIKE DOMAIN ANTIBODY RESULT: 77.4 U/ML — HIGH
CREAT SERPL-MCNC: 0.54 MG/DL — SIGNIFICANT CHANGE UP (ref 0.5–1.3)
EOSINOPHIL # BLD AUTO: 0.14 K/UL — SIGNIFICANT CHANGE UP (ref 0–0.5)
EOSINOPHIL NFR BLD AUTO: 2.3 % — SIGNIFICANT CHANGE UP (ref 0–6)
GLUCOSE SERPL-MCNC: 97 MG/DL — SIGNIFICANT CHANGE UP (ref 70–99)
HCT VFR BLD CALC: 31.1 % — LOW (ref 34.5–45)
HGB BLD-MCNC: 10.7 G/DL — LOW (ref 11.5–15.5)
IANC: 3.59 K/UL — SIGNIFICANT CHANGE UP (ref 1.5–8.5)
IMM GRANULOCYTES NFR BLD AUTO: 0.5 % — SIGNIFICANT CHANGE UP (ref 0–1.5)
LYMPHOCYTES # BLD AUTO: 1.35 K/UL — SIGNIFICANT CHANGE UP (ref 1–3.3)
LYMPHOCYTES # BLD AUTO: 22.2 % — SIGNIFICANT CHANGE UP (ref 13–44)
MAGNESIUM SERPL-MCNC: 2.1 MG/DL — SIGNIFICANT CHANGE UP (ref 1.6–2.6)
MCHC RBC-ENTMCNC: 28.8 PG — SIGNIFICANT CHANGE UP (ref 27–34)
MCHC RBC-ENTMCNC: 34.4 GM/DL — SIGNIFICANT CHANGE UP (ref 32–36)
MCV RBC AUTO: 83.6 FL — SIGNIFICANT CHANGE UP (ref 80–100)
MONOCYTES # BLD AUTO: 0.94 K/UL — HIGH (ref 0–0.9)
MONOCYTES NFR BLD AUTO: 15.5 % — HIGH (ref 2–14)
NEUTROPHILS # BLD AUTO: 3.59 K/UL — SIGNIFICANT CHANGE UP (ref 1.8–7.4)
NEUTROPHILS NFR BLD AUTO: 59.2 % — SIGNIFICANT CHANGE UP (ref 43–77)
NRBC # BLD: 0 /100 WBCS — SIGNIFICANT CHANGE UP
NRBC # FLD: 0 K/UL — SIGNIFICANT CHANGE UP
PHOSPHATE SERPL-MCNC: 2.9 MG/DL — SIGNIFICANT CHANGE UP (ref 2.5–4.5)
PLATELET # BLD AUTO: 367 K/UL — SIGNIFICANT CHANGE UP (ref 150–400)
POTASSIUM SERPL-MCNC: 3.7 MMOL/L — SIGNIFICANT CHANGE UP (ref 3.5–5.3)
POTASSIUM SERPL-SCNC: 3.7 MMOL/L — SIGNIFICANT CHANGE UP (ref 3.5–5.3)
PROT SERPL-MCNC: 7.4 G/DL — SIGNIFICANT CHANGE UP (ref 6–8.3)
RBC # BLD: 3.72 M/UL — LOW (ref 3.8–5.2)
RBC # FLD: 13.9 % — SIGNIFICANT CHANGE UP (ref 10.3–14.5)
SARS-COV-2 IGG+IGM SERPL QL IA: 77.4 U/ML — HIGH
SARS-COV-2 IGG+IGM SERPL QL IA: POSITIVE
SODIUM SERPL-SCNC: 135 MMOL/L — SIGNIFICANT CHANGE UP (ref 135–145)
WBC # BLD: 6.07 K/UL — SIGNIFICANT CHANGE UP (ref 3.8–10.5)
WBC # FLD AUTO: 6.07 K/UL — SIGNIFICANT CHANGE UP (ref 3.8–10.5)

## 2021-07-28 PROCEDURE — 99239 HOSP IP/OBS DSCHRG MGMT >30: CPT | Mod: GC

## 2021-07-28 RX ORDER — ACETAMINOPHEN 500 MG
2 TABLET ORAL
Qty: 0 | Refills: 0 | DISCHARGE
Start: 2021-07-28

## 2021-07-28 RX ORDER — FOLIC ACID 0.8 MG
1 TABLET ORAL
Qty: 30 | Refills: 0
Start: 2021-07-28 | End: 2021-08-26

## 2021-07-28 RX ORDER — DIPHENHYDRAMINE HYDROCHLORIDE AND LIDOCAINE HYDROCHLORIDE AND ALUMINUM HYDROXIDE AND MAGNESIUM HYDRO
15 KIT
Qty: 0 | Refills: 0 | DISCHARGE
Start: 2021-07-28

## 2021-07-28 RX ORDER — FOLIC ACID 0.8 MG
1 TABLET ORAL
Qty: 0 | Refills: 0 | DISCHARGE
Start: 2021-07-28

## 2021-07-28 RX ORDER — DIPHENHYDRAMINE HYDROCHLORIDE AND LIDOCAINE HYDROCHLORIDE AND ALUMINUM HYDROXIDE AND MAGNESIUM HYDRO
15 KIT
Qty: 840 | Refills: 0
Start: 2021-07-28 | End: 2021-08-10

## 2021-07-28 RX ORDER — LACTOBACILLUS ACIDOPHILUS 100MM CELL
1 CAPSULE ORAL
Qty: 0 | Refills: 0 | DISCHARGE
Start: 2021-07-28

## 2021-07-28 RX ADMIN — DIPHENHYDRAMINE HYDROCHLORIDE AND LIDOCAINE HYDROCHLORIDE AND ALUMINUM HYDROXIDE AND MAGNESIUM HYDRO 15 MILLILITER(S): KIT at 05:25

## 2021-07-28 RX ADMIN — Medication 1 TABLET(S): at 12:35

## 2021-07-28 RX ADMIN — DIPHENHYDRAMINE HYDROCHLORIDE AND LIDOCAINE HYDROCHLORIDE AND ALUMINUM HYDROXIDE AND MAGNESIUM HYDRO 15 MILLILITER(S): KIT at 12:31

## 2021-07-28 RX ADMIN — Medication 450 MILLIGRAM(S): at 13:48

## 2021-07-28 RX ADMIN — Medication 650 MILLIGRAM(S): at 03:58

## 2021-07-28 RX ADMIN — Medication 1 MILLIGRAM(S): at 12:37

## 2021-07-28 RX ADMIN — Medication 650 MILLIGRAM(S): at 02:58

## 2021-07-28 RX ADMIN — Medication 100 MILLIGRAM(S): at 05:25

## 2021-07-28 NOTE — PROGRESS NOTE ADULT - PROBLEM SELECTOR PLAN 2
Assessment:  - hx of pregnancy, planned, 5 weeks gestation as per LMP  - Serum hC  - saw outpt OBGYN last week for 1st prenatal visit, prenatal labs sent, transvaginal US in office was not able to appreciate fetus, pt had apt for  (day of admission) although did not go because of developing cellulitis.   - no hx or PE findings of sexually transmitted infections  - HIV negative   - will obtain pelvic ultrasound to evaluate pregnancy status  - obtain outpt OBGYN appt for pt   - c/w folic acid and multivitamin for pregnancy status  - avoid teratogenic medications
- hx of pregnancy, planned  - Transvaginal US 07.27.21: Early intrauterine gestation at 6 weeks 2 days based on mean sac diameter. Yolk sac  vaguely discernible. No fetal pole identified, possibly due to the early stage of gestation. Follow-up serum quantitative beta-hCG.   - Serum hCG 7/26/21: 55934  - saw outpt OBGYN on 7/17/21 for 1st prenatal visit, prenatal labs sent, transvaginal US in office was not able to appreciate fetus, pt had apt for 7/26 (day of admission) although did not go because of developing cellulitis.   - fu with outpt OBGYN 8/2/21 at 7:45pm, patient aware   - no hx or PE findings of sexually transmitted infections  - HIV negative   - c/w folic acid and multivitamin for pregnancy status  - avoid teratogenic medications

## 2021-07-28 NOTE — DISCHARGE NOTE NURSING/CASE MANAGEMENT/SOCIAL WORK - PATIENT PORTAL LINK FT
You can access the FollowMyHealth Patient Portal offered by [facility] by registering at the following website: [email or web address]. By joining [email or web address]’s FollowMyHealth portal, you will also be able to view your health information using other applications (apps) compatible with our system.

## 2021-07-28 NOTE — PROGRESS NOTE ADULT - ATTENDING COMMENTS
36yr old woman currently 5 weeks pregnant with no significant PMH p/w left face swelling and pain after dental extraction. Admitted with left facial cellulitis.    left face swelling improving    CT Maxillofacial with no abscess on imaging. No need for any intervention per OMFS  Switch to PO Clindamycin 450mg q8 to complete 7 days total, Tylenol PRN for pain control, ice pack to left face  US TV confirms early uterine gestation  Tolerating PO meds and soft diet  Stable for DC home with outpatient dental and OB followup. DC time: 32 min 36yr old woman currently 5 weeks pregnant with no significant PMH p/w left face swelling and pain after dental extraction. Admitted with left facial cellulitis.    left face swelling improving and pain is controlled    CT Maxillofacial with no abscess on imaging. No need for any intervention per OMFS  Switch to PO Clindamycin 450mg q8 to complete 7 days total, Tylenol PRN for pain control, ice pack to left face  US TV confirms early uterine gestation  Tolerating PO meds and soft diet  Stable for DC home with outpatient dental and OB followup. DC time: 32 min

## 2021-07-28 NOTE — PROGRESS NOTE ADULT - PROBLEM SELECTOR PLAN 3
- SCDs for DVT ppx  - Diet: Soft diet to allow facial muscles to rest
- SCDs for DVT ppx  - Diet: Soft diet to allow facial muscles to rest

## 2021-07-28 NOTE — PROGRESS NOTE ADULT - SUBJECTIVE AND OBJECTIVE BOX
[facility] [place]   #[id number]     37 y/o female 2 weeks s/p serial extractions presents to the ED with L sided facial swelling and chief complaint of swelling and trismus that has been developing for the past week. Patient is 5 weeks pregnant.    24 hr events:   -pt reports improvement in L facial swelling   -Improvement in pain control   -WBC 6.07 from previous 9.12 (7/27/2021)     PAST MEDICAL & SURGICAL HISTORY: None reported    MEDICATIONS: None reported    Allergies: penicillins (Unknown)    OBJECTIVE:   T(C): 36.6 (07-28-21 @ 05:15), Max: 37.1 (07-27-21 @ 21:30)  HR: 74 (07-28-21 @ 05:15) (74 - 87)  BP: 108/66 (07-28-21 @ 05:15) (108/66 - 125/70)  RR: 17 (07-28-21 @ 05:15) (16 - 18)  SpO2: 100% (07-28-21 @ 05:15) (100% - 100%)  Wt(kg): --  CAPILLARY BLOOD GLUCOSE    Physical exam:  Gen: AAOx4. NAD. Patient experiencing no dyspnea, dysphagia.  Head: NC, asymmetric with L lower1/3 facial edema. Inferior border of the mandible palpable on right, not palpable on left.   Eyes: PERRLA, EOMI.  Ears: EACs patent w/o discharge.  Nose: Nares patent w/o discharge.  Neck: Right side soft and supple, left side tender to palpation   IOE: Patient experiencing significant trismus with VANCE of ~15 mm. Firm swelling in the buccal vestibule extending to the buccal mucosa. FOM soft and not raised.  Neuro: CN II-XII grossly intact  I&O's Detail  __________________________  LABS:  CBC Full  -  ( 28 Jul 2021 06:25 )  WBC Count : 6.07 K/uL  RBC Count : 3.72 M/uL  Hemoglobin : 10.7 g/dL  Hematocrit : 31.1 %  Platelet Count - Automated : 367 K/uL  Mean Cell Volume : 83.6 fL  Mean Cell Hemoglobin : 28.8 pg  Mean Cell Hemoglobin Concentration : 34.4 gm/dL  Auto Neutrophil # : 3.59 K/uL  Auto Lymphocyte # : 1.35 K/uL  Auto Monocyte # : 0.94 K/uL  Auto Eosinophil # : 0.14 K/uL  Auto Basophil # : 0.02 K/uL  Auto Neutrophil % : 59.2 %  Auto Lymphocyte % : 22.2 %  Auto Monocyte % : 15.5 %  Auto Eosinophil % : 2.3 %  Auto Basophil % : 0.3 %    07-28    135  |  102  |  6<L>  ----------------------------<  97  3.7   |  19<L>  |  0.54    Ca    9.3      28 Jul 2021 06:25  Phos  2.9     07-28  Mg     2.10     07-28    TPro  7.4  /  Alb  3.8  /  TBili  0.4  /  DBili  x   /  AST  21  /  ALT  55<H>  /  AlkPhos  70  07-28      Radiology:    IMPRESSION:  Extensive LEFT sided facial cellulitis with edema within the LEFT buccal fat, thickening of the LEFT platysma and masseter muscle. Fluid seen in several empty sockets within the mandible and maxilla. There is breakthrough of lateral LEFT mandibular cortex about the socket of the first LEFT mandibular molar which may be infected. No overlying abscess is seen. Reactive level 1 lymph nodes are noted.       [facility] [place]   #[id number]     35 y/o female 2 weeks s/p serial extractions presents to the ED with L sided facial swelling and chief complaint of swelling and trismus that has been developing for the past week. Patient is 5 weeks pregnant.    24 hr events:   -pt reports improvement in L facial swelling   -Improvement in pain control   -WBC 6.07 from previous 9.12 (7/27/2021)     PAST MEDICAL & SURGICAL HISTORY: None reported    MEDICATIONS: None reported    Allergies: penicillins (Unknown)    OBJECTIVE:   T(C): 36.6 (07-28-21 @ 05:15), Max: 37.1 (07-27-21 @ 21:30)  HR: 74 (07-28-21 @ 05:15) (74 - 87)  BP: 108/66 (07-28-21 @ 05:15) (108/66 - 125/70)  RR: 17 (07-28-21 @ 05:15) (16 - 18)  SpO2: 100% (07-28-21 @ 05:15) (100% - 100%)  Wt(kg): --  CAPILLARY BLOOD GLUCOSE    Physical exam:  Gen: AAOx4. NAD. Patient experiencing no dyspnea, dysphagia.  Head: NC, asymmetric with L lower1/3 facial edema. Inferior border of the mandible palpable on right, not palpable on left.   Eyes: PERRLA, EOMI.  Ears: EACs patent w/o discharge.  Nose: Nares patent w/o discharge.  Neck: Right side soft and supple, left side tender to palpation with improvement in L extraoral swelling.   IOE: Patient experiencing significant trismus with VANCE of ~20 mm. Firm swelling in the buccal vestibule extending to the buccal mucosa. FOM soft and not raised.  Neuro: CN II-XII grossly intact    I&O's Detail  __________________________  LABS:  CBC Full  -  ( 28 Jul 2021 06:25 )  WBC Count : 6.07 K/uL  RBC Count : 3.72 M/uL  Hemoglobin : 10.7 g/dL  Hematocrit : 31.1 %  Platelet Count - Automated : 367 K/uL  Mean Cell Volume : 83.6 fL  Mean Cell Hemoglobin : 28.8 pg  Mean Cell Hemoglobin Concentration : 34.4 gm/dL  Auto Neutrophil # : 3.59 K/uL  Auto Lymphocyte # : 1.35 K/uL  Auto Monocyte # : 0.94 K/uL  Auto Eosinophil # : 0.14 K/uL  Auto Basophil # : 0.02 K/uL  Auto Neutrophil % : 59.2 %  Auto Lymphocyte % : 22.2 %  Auto Monocyte % : 15.5 %  Auto Eosinophil % : 2.3 %  Auto Basophil % : 0.3 %    07-28    135  |  102  |  6<L>  ----------------------------<  97  3.7   |  19<L>  |  0.54    Ca    9.3      28 Jul 2021 06:25  Phos  2.9     07-28  Mg     2.10     07-28    TPro  7.4  /  Alb  3.8  /  TBili  0.4  /  DBili  x   /  AST  21  /  ALT  55<H>  /  AlkPhos  70  07-28    Radiology:    IMPRESSION:  Extensive LEFT sided facial cellulitis with edema within the LEFT buccal fat, thickening of the LEFT platysma and masseter muscle. Fluid seen in several empty sockets within the mandible and maxilla. There is breakthrough of lateral LEFT mandibular cortex about the socket of the first LEFT mandibular molar which may be infected. No overlying abscess is seen. Reactive level 1 lymph nodes are noted.

## 2021-07-28 NOTE — PROGRESS NOTE ADULT - PROBLEM SELECTOR PLAN 1
- patient with hx of dental procedure presented for new onset facial swelling for 1 week, concerning for abscess  - CT scan shows Extensive LEFT sided facial cellulitis with edema within the LEFT buccal fat, thickening of the LEFT platysma and masseter muscle, infected L molar  - WBc 10.98 on admission, now wnl  - afebrile  - no signs of respiratory compromise  - tolerating liquids and solids if she pushes the food to back of her mouth   - patient currently nontoxic appearing, comfortable at bedside  - OMFS surgery consult - no surgical intervention, start IV abx  - appreciate OMFS recs   - c/w IV clindamycin 600mg Q8hr. Admitting team spoke with pharmacy in regards to pregnancy status. Clindamycin is not a teratogenic and is safe for pregnancy. Patient is allergic to penicillins (gets a rash and hives as per patient)  - if improvement in jaw opening, ability to chew, remains afebrile, and no signs of developing abscess, can likely be discharged tomorrow on PO clindamycin  - monitor for improvement  - copy of the referral note from the outpatient dentist placed in the chart  - pain control with Magic Mouth Wash, Ice Pack, Tylenol 650mg oral, avoid NSAIDs
- patient with hx of dental procedure presented for new onset facial swelling for ~2 weeks  - CT scan shows Extensive LEFT sided facial cellulitis with edema within the LEFT buccal fat, thickening of the LEFT platysma and masseter muscle, infected L molar  - WBC 10.98 on admission, now wnl  - afebrile  - no signs of respiratory compromise  - tolerating liquids and solids   - patient currently nontoxic appearing, comfortable at bedside  - [facility] [place] - no surgical intervention, c/w abx, fu with dentist outpt  - appreciate FS recs   - switch IV clindamycin 600mg Q8hr to oral clindamycin 450mg TID for total of 7 days. Admitting team spoke with pharmacy in regards to pregnancy status. Clindamycin is not a teratogenic and is safe for pregnancy. Patient is allergic to penicillins (gets a rash and hives as per patient)  - No notable improvement in jaw opening, likely 2/2 to trismus and will likely not improve significantly during this hospitalization, pt should continue with jaw opening exercises after discharge   - pt tolerate PO well, remains afebrile, and no signs of developing abscess, can be discharged today and should take clindamycin for 5 more days  - copy of the referral note from the outpatient dentist placed in the chart  - pain control with Magic Mouth Wash, Ice Pack, Tylenol 650mg oral, avoid NSAIDs
No

## 2021-07-28 NOTE — DISCHARGE NOTE NURSING/CASE MANAGEMENT/SOCIAL WORK - NSDCFUADDAPPT_GEN_ALL_CORE_FT
Please follow up with Dr. Yaima Owens at [facility] at [address] [place] [address] on Monday 08/02/21 at 7:45 pm     Please follow up with Dr. Neli Mehta at [address] [place] [address] on Friday 7/30/21 at 9:00 am.

## 2021-07-28 NOTE — PROGRESS NOTE ADULT - ASSESSMENT
35 y/o patient presents with left sided facial swelling 2 weeks s/p extractions. No acute abscess noted on CT.    Recommendations:   - No surgical intervention from OMFS standpoint   - IV clindamycin and monitor overnight  - Pain control per primary  - Follow up with external oral surgeon    Oral and Maxillofacial Surgery   [id number]     37 y/o patient presents with left sided facial swelling 2 weeks s/p extractions. No acute abscess noted on CT.     Recommendations:   - No surgical intervention from OMFS standpoint   - Continue IV clindamycin and monitor  - Pain control per primary  - Follow up with external oral surgeon  - OMFS to follow

## 2021-07-28 NOTE — PROGRESS NOTE ADULT - SUBJECTIVE AND OBJECTIVE BOX
Siddhartha Castroole Morelandlaura MS4  Internal Medicine  Pager #[phone]    Patient is a 36y old  Female who presents with a chief complaint of Left facial swelling (28 Jul 2021 07:31)      SUBJECTIVE / OVERNIGHT EVENTS:  Patient seen and evaluated at bedside. No acute events overnight. Pt notes improvement in L facial swelling. Pain well controlled on Tylenol and Magic Mouthwash. No fevers/chills. Denies dysphagia. Denies SOB at rest, chest pain, palpitations, abdominal pain, nausea/vomiting.    ADDITIONAL REVIEW OF SYSTEMS:    CONSTITUTIONAL: No weakness, fevers or chills  EYES/ENT: + L sided facial swelling in mandibular region. No visual changes;  No vertigo or throat pain.  NECK: No pain or stiffness.  RESPIRATORY: No cough, wheezing, hemoptysis; No shortness of breath.  CARDIOVASCULAR: No chest pain or palpitations  GASTROINTESTINAL: + constipation. no abdominal pain. No nausea, vomiting, diarrhea, or melena.  GENITOURINARY: + freq, no dysuria or hematuria  NEUROLOGICAL: No numbness or weakness  All other review of systems is negative unless indicated above.    MEDICATIONS  (STANDING):  clindamycin IVPB 600 milliGRAM(s) IV Intermittent every 8 hours  FIRST- Mouthwash  BLM 15 milliLiter(s) Swish and Spit four times a day  folic acid 1 milliGRAM(s) Oral daily  lactobacillus acidophilus 1 Tablet(s) Oral daily  multivitamin 1 Tablet(s) Oral daily    MEDICATIONS  (PRN):  acetaminophen   Tablet .. 650 milliGRAM(s) Oral every 6 hours PRN Mild Pain (1 - 3)      CAPILLARY BLOOD GLUCOSE        I&O's Summary      PHYSICAL EXAM:  Vital Signs Last 24 Hrs  T(C): 36.6 (28 Jul 2021 05:15), Max: 37.1 (27 Jul 2021 21:30)  T(F): 97.8 (28 Jul 2021 05:15), Max: 98.7 (27 Jul 2021 21:30)  HR: 74 (28 Jul 2021 05:15) (74 - 87)  BP: 108/66 (28 Jul 2021 05:15) (108/66 - 125/70)  BP(mean): --  RR: 17 (28 Jul 2021 05:15) (16 - 18)  SpO2: 100% (28 Jul 2021 05:15) (100% - 100%)    CONSTITUTIONAL: NAD, well-developed  HEENT: NC, asymmetric with L lower 1/3 facial edema, improved from prior exam. L lower 1/3 face tender to palpation, less indurated as compared with prior exam, with no palpable fluid collections. Maximum opening of mouth ~15mm, no significant change. No pus noted on gross inspection of oropharynx.   RESPIRATORY: Normal respiratory effort; lungs are clear to auscultation bilaterally  CARDIOVASCULAR: Regular rate, normal S1 and S2, no murmur/rub/gallop; No lower extremity edema; Peripheral pulses are 2+ bilaterally  ABDOMEN: Nontender to palpation, normoactive bowel sounds, no rebound/guarding; No hepatosplenomegaly  MSK: no cyanosis of digits; no joint swelling or tenderness to palpation, full strength all extremities.  NEURO: No focal deficits, CN II-XII grossly intact b/l; sensation to light touch intact in all extremities.   PSYCH: A+O to person, place, and time; affect appropriate.      LABS:                        10.7   6.07  )-----------( 367      ( 28 Jul 2021 06:25 )             31.1     07-28    135  |  102  |  6<L>  ----------------------------<  97  3.7   |  19<L>  |  0.54    Ca    9.3      28 Jul 2021 06:25  Phos  2.9     07-28  Mg     2.10     07-28    TPro  7.4  /  Alb  3.8  /  TBili  0.4  /  DBili  x   /  AST  21  /  ALT  55<H>  /  AlkPhos  70  07-28      RADIOLOGY & ADDITIONAL TESTS:  Results Reviewed:   Imaging Personally Reviewed:   < from: CT Maxillofacial w/ IV Cont (07.26.21 @ 19:43) >  IMPRESSION:  Extensive LEFT sided facial cellulitis with edema within the LEFT buccal fat, thickening of the LEFT platysma and masseter muscle. Fluid seen in several empty sockets within the mandible and maxilla. There is breakthrough of lateral LEFT mandibular cortex about the socket of the first LEFT mandibularmolar which may be infected. No overlying abscess is seen. Reactive level 1 lymph nodes are noted.    < end of copied text >    COORDINATION OF CARE:  Care Discussed with Consultants/Other Providers [Y/N]: y  Prior or Outpatient Records Reviewed [Y/N]: y     Siddhartha Wilkerson MS4  Internal Medicine  Pager #[phone]    Patient is a 36y old  Female who presents with a chief complaint of Left facial swelling (28 Jul 2021 07:31)      SUBJECTIVE / OVERNIGHT EVENTS:  Patient seen and evaluated at bedside. No acute events overnight. Pt notes improvement in L facial swelling. Pain well controlled on Tylenol and Magic Mouthwash. No fevers/chills. Denies dysphagia. Denies SOB at rest, chest pain, palpitations, abdominal pain, nausea/vomiting.    ADDITIONAL REVIEW OF SYSTEMS:    CONSTITUTIONAL: No weakness, fevers or chills  EYES/ENT: + L sided facial swelling in mandibular region, improved. No visual changes;  No vertigo or throat pain.  NECK: No pain or stiffness.  RESPIRATORY: No cough, wheezing, hemoptysis; No shortness of breath.  CARDIOVASCULAR: No chest pain or palpitations  GASTROINTESTINAL: + constipation. no abdominal pain. No nausea, vomiting, diarrhea, or melena.  GENITOURINARY: + freq, no dysuria or hematuria  NEUROLOGICAL: No numbness or weakness  All other review of systems is negative unless indicated above.    MEDICATIONS  (STANDING):  clindamycin IVPB 600 milliGRAM(s) IV Intermittent every 8 hours  FIRST- Mouthwash  BLM 15 milliLiter(s) Swish and Spit four times a day  folic acid 1 milliGRAM(s) Oral daily  lactobacillus acidophilus 1 Tablet(s) Oral daily  multivitamin 1 Tablet(s) Oral daily    MEDICATIONS  (PRN):  acetaminophen   Tablet .. 650 milliGRAM(s) Oral every 6 hours PRN Mild Pain (1 - 3)      PHYSICAL EXAM:  Vital Signs Last 24 Hrs  T(C): 36.6 (28 Jul 2021 05:15), Max: 37.1 (27 Jul 2021 21:30)  T(F): 97.8 (28 Jul 2021 05:15), Max: 98.7 (27 Jul 2021 21:30)  HR: 74 (28 Jul 2021 05:15) (74 - 87)  BP: 108/66 (28 Jul 2021 05:15) (108/66 - 125/70)  BP(mean): --  RR: 17 (28 Jul 2021 05:15) (16 - 18)  SpO2: 100% (28 Jul 2021 05:15) (100% - 100%)    CONSTITUTIONAL: NAD, well-developed  HEENT: NC, asymmetric with L lower 1/3 facial edema, improved from prior exam. L lower 1/3 face mildly tender to palpation, less indurated as compared with prior exam, with no palpable fluid collections. Maximum opening of mouth ~15mm, no significant change. No pus noted on gross inspection of oropharynx.   RESPIRATORY: Normal respiratory effort; lungs are clear to auscultation bilaterally  CARDIOVASCULAR: Regular rate, normal S1 and S2, no murmur/rub/gallop; No lower extremity edema; Peripheral pulses are 2+ bilaterally  ABDOMEN: Nontender to palpation, normoactive bowel sounds, no rebound/guarding; No hepatosplenomegaly  MSK: no cyanosis of digits; no joint swelling or tenderness to palpation, full strength all extremities.  NEURO: No focal deficits, CN II-XII grossly intact b/l; sensation to light touch intact in all extremities.   PSYCH: A+O to person, place, and time; affect appropriate.      LABS:                        10.7   6.07  )-----------( 367      ( 28 Jul 2021 06:25 )             31.1     07-28    135  |  102  |  6<L>  ----------------------------<  97  3.7   |  19<L>  |  0.54    Ca    9.3      28 Jul 2021 06:25  Phos  2.9     07-28  Mg     2.10     07-28    TPro  7.4  /  Alb  3.8  /  TBili  0.4  /  DBili  x   /  AST  21  /  ALT  55<H>  /  AlkPhos  70  07-28    HCG Quantitative, Serum: 84008.0:     HIV-1/2 Combo Result: Nonreact  (07.27.21 @ 07:10)    RADIOLOGY & ADDITIONAL TESTS:  Results Reviewed:   Imaging Personally Reviewed:   < from: CT Maxillofacial w/ IV Cont (07.26.21 @ 19:43) >  IMPRESSION:  Extensive LEFT sided facial cellulitis with edema within the LEFT buccal fat, thickening of the LEFT platysma and masseter muscle. Fluid seen in several empty sockets within the mandible and maxilla. There is breakthrough of lateral LEFT mandibular cortex about the socket of the first LEFT mandibularmolar which may be infected. No overlying abscess is seen. Reactive level 1 lymph nodes are noted.    < end of copied text >    COORDINATION OF CARE:  Care Discussed with Consultants/Other Providers [Y/N]: y  Prior or Outpatient Records Reviewed [Y/N]: y

## 2022-02-04 ENCOUNTER — APPOINTMENT (OUTPATIENT)
Dept: ANTEPARTUM | Facility: CLINIC | Age: 37
End: 2022-02-04
Payer: COMMERCIAL

## 2022-02-04 ENCOUNTER — ASOB RESULT (OUTPATIENT)
Age: 37
End: 2022-02-04

## 2022-02-04 PROCEDURE — 76811 OB US DETAILED SNGL FETUS: CPT

## 2022-02-04 PROCEDURE — 76819 FETAL BIOPHYS PROFIL W/O NST: CPT

## 2022-02-04 PROCEDURE — 99202 OFFICE O/P NEW SF 15 MIN: CPT | Mod: 25

## 2022-02-24 ENCOUNTER — TRANSCRIPTION ENCOUNTER (OUTPATIENT)
Age: 37
End: 2022-02-24

## 2022-02-24 ENCOUNTER — OUTPATIENT (OUTPATIENT)
Dept: INPATIENT UNIT | Facility: HOSPITAL | Age: 37
LOS: 1 days | Discharge: HOME | End: 2022-02-24
Payer: COMMERCIAL

## 2022-02-24 VITALS
SYSTOLIC BLOOD PRESSURE: 132 MMHG | RESPIRATION RATE: 16 BRPM | DIASTOLIC BLOOD PRESSURE: 68 MMHG | HEART RATE: 90 BPM | TEMPERATURE: 98 F

## 2022-02-24 VITALS — DIASTOLIC BLOOD PRESSURE: 64 MMHG | HEART RATE: 77 BPM | SYSTOLIC BLOOD PRESSURE: 121 MMHG

## 2022-02-24 DIAGNOSIS — Z3A.00 WEEKS OF GESTATION OF PREGNANCY NOT SPECIFIED: ICD-10-CM

## 2022-02-24 DIAGNOSIS — O26.899 OTHER SPECIFIED PREGNANCY RELATED CONDITIONS, UNSPECIFIED TRIMESTER: ICD-10-CM

## 2022-02-24 DIAGNOSIS — Z98.891 HISTORY OF UTERINE SCAR FROM PREVIOUS SURGERY: Chronic | ICD-10-CM

## 2022-02-24 PROCEDURE — 99213 OFFICE O/P EST LOW 20 MIN: CPT | Mod: 25

## 2022-02-24 PROCEDURE — 76818 FETAL BIOPHYS PROFILE W/NST: CPT | Mod: 26

## 2022-02-24 NOTE — OB PROVIDER TRIAGE NOTE - NSOBPROVIDERNOTE_OBGYN_ALL_OB_FT
NST in progress NST in progress    2 hr NST reactive, BPP reassuring  pt feels well  d/w MD [place]  pt to be discharged home with  OB labor instructions  daily kick counts  pt to follow up with OBGYN next Friday as scheduled, in addition to MFM sonogram

## 2022-02-24 NOTE — OB PROVIDER TRIAGE NOTE - HISTORY OF PRESENT ILLNESS
38 y/o  at 35.6 weeks gestation sent in from OBGYN office for variable decelerations on NST monitor. Denies lof, vb, cramping, back pain, abdominal pain. Reports good FM. Pt last ate at 7pm, [employer]. Pt desires TOLAC delivery    AP course complicated by AMA, obesity BMI 48, and LGA (last MFM sono 2/ 95th percentile for weight)  Allergies:  -PCN (rash, hives)  Current medications:  -PNV  -Iron  OBGYN history:  -primary c/s  FT 7lbs 15oz for category 2 tracing and fetal distress  -chlamydia age 20  Medical history:  -obesity  -sickle cell trait - FOB not tested  -childhood asthma - denies intubations or hospitalizations  Surgical history:  -c/s  Denies psych history  Denies smoking, alcohol, and illicit drug use

## 2022-02-24 NOTE — OB RN TRIAGE NOTE - FALL HARM RISK - UNIVERSAL INTERVENTIONS
Bed in lowest position, wheels locked, appropriate side rails in place/Call bell, personal items and telephone in reach/Instruct patient to call for assistance before getting out of bed or chair/Non-slip footwear when patient is out of bed/[place] to call system/Physically safe environment - no spills, clutter or unnecessary equipment/Purposeful Proactive Rounding/Room/bathroom lighting operational, light cord in reach

## 2022-02-24 NOTE — OB PROVIDER TRIAGE NOTE - YOU WERE IN THE HOSPITAL FOR:
NST in progress    2 hr NST reactive, BPP reassuring  pt feels well  d/w MD Asemota  pt to be discharged home with  OB labor instructions  daily kick counts  pt to follow up with OBGYN next Friday as scheduled, in addition to MFM sonogram

## 2022-02-24 NOTE — OB PROVIDER TRIAGE NOTE - NS_FETALMONCTXPAT_OBGYN_ALL_OB
Called pt's pharmacy and spoke with Angel she stated that the cost would be 8.95.     Called pt and advised her. She stated that price would be okay.       ----- Message from Salvador Mccarthy MD sent at 4/8/2020  1:04 PM EDT -----  Please find the cost of Chantix for her and see if we can get a preauthorization for this through her insurance.     No Contractions

## 2022-02-25 ENCOUNTER — APPOINTMENT (OUTPATIENT)
Dept: ANTEPARTUM | Facility: CLINIC | Age: 37
End: 2022-02-25

## 2022-02-27 PROBLEM — J45.909 UNSPECIFIED ASTHMA, UNCOMPLICATED: Chronic | Status: ACTIVE | Noted: 2022-02-24

## 2022-02-27 PROBLEM — E66.9 OBESITY, UNSPECIFIED: Chronic | Status: ACTIVE | Noted: 2022-02-24

## 2022-02-27 PROBLEM — D57.3 SICKLE-CELL TRAIT: Chronic | Status: ACTIVE | Noted: 2022-02-24

## 2022-03-04 ENCOUNTER — ASOB RESULT (OUTPATIENT)
Age: 37
End: 2022-03-04

## 2022-03-04 ENCOUNTER — TRANSCRIPTION ENCOUNTER (OUTPATIENT)
Age: 37
End: 2022-03-04

## 2022-03-04 ENCOUNTER — APPOINTMENT (OUTPATIENT)
Dept: ANTEPARTUM | Facility: CLINIC | Age: 37
End: 2022-03-04
Payer: COMMERCIAL

## 2022-03-04 PROCEDURE — 76819 FETAL BIOPHYS PROFIL W/O NST: CPT

## 2022-03-05 ENCOUNTER — INPATIENT (INPATIENT)
Facility: HOSPITAL | Age: 37
LOS: 1 days | Discharge: HOME | End: 2022-03-07
Attending: STUDENT IN AN ORGANIZED HEALTH CARE EDUCATION/TRAINING PROGRAM | Admitting: STUDENT IN AN ORGANIZED HEALTH CARE EDUCATION/TRAINING PROGRAM

## 2022-03-05 VITALS
DIASTOLIC BLOOD PRESSURE: 61 MMHG | SYSTOLIC BLOOD PRESSURE: 131 MMHG | TEMPERATURE: 99 F | RESPIRATION RATE: 16 BRPM | HEART RATE: 83 BPM

## 2022-03-05 DIAGNOSIS — O34.219 MATERNAL CARE FOR UNSPECIFIED TYPE SCAR FROM PREVIOUS CESAREAN DELIVERY: ICD-10-CM

## 2022-03-05 DIAGNOSIS — Z3A.00 WEEKS OF GESTATION OF PREGNANCY NOT SPECIFIED: ICD-10-CM

## 2022-03-05 DIAGNOSIS — O26.899 OTHER SPECIFIED PREGNANCY RELATED CONDITIONS, UNSPECIFIED TRIMESTER: ICD-10-CM

## 2022-03-05 DIAGNOSIS — Z98.891 HISTORY OF UTERINE SCAR FROM PREVIOUS SURGERY: Chronic | ICD-10-CM

## 2022-03-05 LAB
B PERT DNA SPEC QL NAA+PROBE: SIGNIFICANT CHANGE UP
B PERT+PARAPERT DNA PNL SPEC NAA+PROBE: SIGNIFICANT CHANGE UP
BASOPHILS # BLD AUTO: 0.02 K/UL — SIGNIFICANT CHANGE UP (ref 0–0.2)
BASOPHILS NFR BLD AUTO: 0.3 % — SIGNIFICANT CHANGE UP (ref 0–2)
BORDETELLA PARAPERTUSSIS (RAPRVP): SIGNIFICANT CHANGE UP
C PNEUM DNA SPEC QL NAA+PROBE: SIGNIFICANT CHANGE UP
COVID-19 SPIKE DOMAIN AB INTERP: POSITIVE
COVID-19 SPIKE DOMAIN ANTIBODY RESULT: >250 U/ML — HIGH
EOSINOPHIL # BLD AUTO: 0.13 K/UL — SIGNIFICANT CHANGE UP (ref 0–0.5)
EOSINOPHIL NFR BLD AUTO: 1.7 % — SIGNIFICANT CHANGE UP (ref 0–6)
FLUAV SUBTYP SPEC NAA+PROBE: SIGNIFICANT CHANGE UP
FLUBV RNA SPEC QL NAA+PROBE: SIGNIFICANT CHANGE UP
HADV DNA SPEC QL NAA+PROBE: SIGNIFICANT CHANGE UP
HBV SURFACE AG SERPL QL IA: SIGNIFICANT CHANGE UP
HCOV 229E RNA SPEC QL NAA+PROBE: SIGNIFICANT CHANGE UP
HCOV HKU1 RNA SPEC QL NAA+PROBE: SIGNIFICANT CHANGE UP
HCOV NL63 RNA SPEC QL NAA+PROBE: SIGNIFICANT CHANGE UP
HCOV OC43 RNA SPEC QL NAA+PROBE: SIGNIFICANT CHANGE UP
HCT VFR BLD CALC: 30.1 % — LOW (ref 34.5–45)
HGB BLD-MCNC: 10.3 G/DL — LOW (ref 11.5–15.5)
HMPV RNA SPEC QL NAA+PROBE: SIGNIFICANT CHANGE UP
HPIV1 RNA SPEC QL NAA+PROBE: SIGNIFICANT CHANGE UP
HPIV2 RNA SPEC QL NAA+PROBE: SIGNIFICANT CHANGE UP
HPIV3 RNA SPEC QL NAA+PROBE: SIGNIFICANT CHANGE UP
HPIV4 RNA SPEC QL NAA+PROBE: SIGNIFICANT CHANGE UP
IANC: 5.2 K/UL — SIGNIFICANT CHANGE UP (ref 1.5–8.5)
IMM GRANULOCYTES NFR BLD AUTO: 0.4 % — SIGNIFICANT CHANGE UP (ref 0–1.5)
LYMPHOCYTES # BLD AUTO: 1.22 K/UL — SIGNIFICANT CHANGE UP (ref 1–3.3)
LYMPHOCYTES # BLD AUTO: 16 % — SIGNIFICANT CHANGE UP (ref 13–44)
M PNEUMO DNA SPEC QL NAA+PROBE: SIGNIFICANT CHANGE UP
MCHC RBC-ENTMCNC: 28 PG — SIGNIFICANT CHANGE UP (ref 27–34)
MCHC RBC-ENTMCNC: 34.2 GM/DL — SIGNIFICANT CHANGE UP (ref 32–36)
MCV RBC AUTO: 81.8 FL — SIGNIFICANT CHANGE UP (ref 80–100)
MONOCYTES # BLD AUTO: 1.03 K/UL — HIGH (ref 0–0.9)
MONOCYTES NFR BLD AUTO: 13.5 % — SIGNIFICANT CHANGE UP (ref 2–14)
NEUTROPHILS # BLD AUTO: 5.2 K/UL — SIGNIFICANT CHANGE UP (ref 1.8–7.4)
NEUTROPHILS NFR BLD AUTO: 68.1 % — SIGNIFICANT CHANGE UP (ref 43–77)
NRBC # BLD: 0 /100 WBCS — SIGNIFICANT CHANGE UP
NRBC # FLD: 0 K/UL — SIGNIFICANT CHANGE UP
PLATELET # BLD AUTO: 256 K/UL — SIGNIFICANT CHANGE UP (ref 150–400)
RAPID RVP RESULT: SIGNIFICANT CHANGE UP
RBC # BLD: 3.68 M/UL — LOW (ref 3.8–5.2)
RBC # FLD: 14.6 % — HIGH (ref 10.3–14.5)
RH IG SCN BLD-IMP: POSITIVE — SIGNIFICANT CHANGE UP
RSV RNA SPEC QL NAA+PROBE: SIGNIFICANT CHANGE UP
RUBV IGG SER-ACNC: 5.6 INDEX — SIGNIFICANT CHANGE UP
RUBV IGG SER-IMP: POSITIVE — SIGNIFICANT CHANGE UP
RV+EV RNA SPEC QL NAA+PROBE: SIGNIFICANT CHANGE UP
SARS-COV-2 IGG+IGM SERPL QL IA: >250 U/ML — HIGH
SARS-COV-2 IGG+IGM SERPL QL IA: POSITIVE
SARS-COV-2 RNA SPEC QL NAA+PROBE: SIGNIFICANT CHANGE UP
T PALLIDUM AB TITR SER: NEGATIVE — SIGNIFICANT CHANGE UP
WBC # BLD: 7.63 K/UL — SIGNIFICANT CHANGE UP (ref 3.8–10.5)
WBC # FLD AUTO: 7.63 K/UL — SIGNIFICANT CHANGE UP (ref 3.8–10.5)

## 2022-03-05 DEVICE — SURGICEL FIBRILLAR 1 X 2": Type: IMPLANTABLE DEVICE | Status: FUNCTIONAL

## 2022-03-05 RX ORDER — SODIUM CHLORIDE 9 MG/ML
1000 INJECTION, SOLUTION INTRAVENOUS ONCE
Refills: 0 | Status: COMPLETED | OUTPATIENT
Start: 2022-03-05 | End: 2022-03-05

## 2022-03-05 RX ORDER — IBUPROFEN 200 MG
600 TABLET ORAL EVERY 6 HOURS
Refills: 0 | Status: COMPLETED | OUTPATIENT
Start: 2022-03-05 | End: 2023-02-01

## 2022-03-05 RX ORDER — DIPHENHYDRAMINE HCL 50 MG
25 CAPSULE ORAL EVERY 6 HOURS
Refills: 0 | Status: DISCONTINUED | OUTPATIENT
Start: 2022-03-05 | End: 2022-03-07

## 2022-03-05 RX ORDER — IRON,CARBONYL 45 MG
1 TABLET ORAL
Qty: 0 | Refills: 0 | DISCHARGE

## 2022-03-05 RX ORDER — SODIUM CHLORIDE 9 MG/ML
1000 INJECTION, SOLUTION INTRAVENOUS
Refills: 0 | Status: DISCONTINUED | OUTPATIENT
Start: 2022-03-05 | End: 2022-03-05

## 2022-03-05 RX ORDER — KETOROLAC TROMETHAMINE 30 MG/ML
30 SYRINGE (ML) INJECTION EVERY 6 HOURS
Refills: 0 | Status: DISCONTINUED | OUTPATIENT
Start: 2022-03-05 | End: 2022-03-06

## 2022-03-05 RX ORDER — MAGNESIUM HYDROXIDE 400 MG/1
30 TABLET, CHEWABLE ORAL
Refills: 0 | Status: DISCONTINUED | OUTPATIENT
Start: 2022-03-05 | End: 2022-03-07

## 2022-03-05 RX ORDER — SODIUM CHLORIDE 9 MG/ML
1000 INJECTION, SOLUTION INTRAVENOUS
Refills: 0 | Status: DISCONTINUED | OUTPATIENT
Start: 2022-03-05 | End: 2022-03-07

## 2022-03-05 RX ORDER — OXYCODONE HYDROCHLORIDE 5 MG/1
5 TABLET ORAL
Refills: 0 | Status: COMPLETED | OUTPATIENT
Start: 2022-03-05 | End: 2022-03-12

## 2022-03-05 RX ORDER — LANOLIN
1 OINTMENT (GRAM) TOPICAL EVERY 6 HOURS
Refills: 0 | Status: DISCONTINUED | OUTPATIENT
Start: 2022-03-05 | End: 2022-03-07

## 2022-03-05 RX ORDER — SIMETHICONE 80 MG/1
80 TABLET, CHEWABLE ORAL EVERY 4 HOURS
Refills: 0 | Status: DISCONTINUED | OUTPATIENT
Start: 2022-03-05 | End: 2022-03-07

## 2022-03-05 RX ORDER — CITRIC ACID/SODIUM CITRATE 300-500 MG
30 SOLUTION, ORAL ORAL ONCE
Refills: 0 | Status: COMPLETED | OUTPATIENT
Start: 2022-03-05 | End: 2022-03-05

## 2022-03-05 RX ORDER — FAMOTIDINE 10 MG/ML
20 INJECTION INTRAVENOUS ONCE
Refills: 0 | Status: DISCONTINUED | OUTPATIENT
Start: 2022-03-05 | End: 2022-03-05

## 2022-03-05 RX ORDER — OXYCODONE HYDROCHLORIDE 5 MG/1
5 TABLET ORAL ONCE
Refills: 0 | Status: DISCONTINUED | OUTPATIENT
Start: 2022-03-05 | End: 2022-03-07

## 2022-03-05 RX ORDER — OXYTOCIN 10 UNIT/ML
333.33 VIAL (ML) INJECTION
Qty: 20 | Refills: 0 | Status: DISCONTINUED | OUTPATIENT
Start: 2022-03-05 | End: 2022-03-06

## 2022-03-05 RX ORDER — INFLUENZA VIRUS VACCINE 15; 15; 15; 15 UG/.5ML; UG/.5ML; UG/.5ML; UG/.5ML
0.5 SUSPENSION INTRAMUSCULAR ONCE
Refills: 0 | Status: DISCONTINUED | OUTPATIENT
Start: 2022-03-05 | End: 2022-03-07

## 2022-03-05 RX ORDER — SODIUM CHLORIDE 9 MG/ML
1000 INJECTION, SOLUTION INTRAVENOUS ONCE
Refills: 0 | Status: DISCONTINUED | OUTPATIENT
Start: 2022-03-05 | End: 2022-03-05

## 2022-03-05 RX ORDER — FAMOTIDINE 10 MG/ML
20 INJECTION INTRAVENOUS ONCE
Refills: 0 | Status: COMPLETED | OUTPATIENT
Start: 2022-03-05 | End: 2022-03-05

## 2022-03-05 RX ORDER — CITRIC ACID/SODIUM CITRATE 300-500 MG
30 SOLUTION, ORAL ORAL ONCE
Refills: 0 | Status: DISCONTINUED | OUTPATIENT
Start: 2022-03-05 | End: 2022-03-05

## 2022-03-05 RX ORDER — OXYTOCIN 10 UNIT/ML
333.33 VIAL (ML) INJECTION
Qty: 20 | Refills: 0 | Status: COMPLETED | OUTPATIENT
Start: 2022-03-05 | End: 2022-03-05

## 2022-03-05 RX ORDER — ACETAMINOPHEN 500 MG
975 TABLET ORAL
Refills: 0 | Status: DISCONTINUED | OUTPATIENT
Start: 2022-03-05 | End: 2022-03-07

## 2022-03-05 RX ORDER — OXYTOCIN 10 UNIT/ML
333.33 VIAL (ML) INJECTION
Qty: 20 | Refills: 0 | Status: DISCONTINUED | OUTPATIENT
Start: 2022-03-05 | End: 2022-03-05

## 2022-03-05 RX ORDER — HEPARIN SODIUM 5000 [USP'U]/ML
10000 INJECTION INTRAVENOUS; SUBCUTANEOUS EVERY 12 HOURS
Refills: 0 | Status: DISCONTINUED | OUTPATIENT
Start: 2022-03-05 | End: 2022-03-07

## 2022-03-05 RX ORDER — SODIUM CHLORIDE 9 MG/ML
1000 INJECTION, SOLUTION INTRAVENOUS
Refills: 0 | Status: DISCONTINUED | OUTPATIENT
Start: 2022-03-05 | End: 2022-03-06

## 2022-03-05 RX ORDER — TETANUS TOXOID, REDUCED DIPHTHERIA TOXOID AND ACELLULAR PERTUSSIS VACCINE, ADSORBED 5; 2.5; 8; 8; 2.5 [IU]/.5ML; [IU]/.5ML; UG/.5ML; UG/.5ML; UG/.5ML
0.5 SUSPENSION INTRAMUSCULAR ONCE
Refills: 0 | Status: DISCONTINUED | OUTPATIENT
Start: 2022-03-05 | End: 2022-03-07

## 2022-03-05 RX ADMIN — FAMOTIDINE 20 MILLIGRAM(S): 10 INJECTION INTRAVENOUS at 04:25

## 2022-03-05 RX ADMIN — Medication 1000 MILLIUNIT(S)/MIN: at 13:00

## 2022-03-05 RX ADMIN — Medication 30 MILLIGRAM(S): at 14:01

## 2022-03-05 RX ADMIN — Medication 30 MILLIGRAM(S): at 20:30

## 2022-03-05 RX ADMIN — HEPARIN SODIUM 10000 UNIT(S): 5000 INJECTION INTRAVENOUS; SUBCUTANEOUS at 12:03

## 2022-03-05 RX ADMIN — Medication 30 MILLILITER(S): at 05:00

## 2022-03-05 RX ADMIN — SODIUM CHLORIDE 2000 MILLILITER(S): 9 INJECTION, SOLUTION INTRAVENOUS at 04:00

## 2022-03-05 RX ADMIN — Medication 30 MILLIGRAM(S): at 13:00

## 2022-03-05 RX ADMIN — Medication 30 MILLIGRAM(S): at 20:15

## 2022-03-05 RX ADMIN — HEPARIN SODIUM 10000 UNIT(S): 5000 INJECTION INTRAVENOUS; SUBCUTANEOUS at 23:00

## 2022-03-05 NOTE — DISCHARGE NOTE OB - MEDICATION SUMMARY - MEDICATIONS TO TAKE
I will START or STAY ON the medications listed below when I get home from the hospital:    acetaminophen 325 mg oral tablet  -- 3 tab(s) by mouth every 6 hours, As Needed  -- Indication: For Pain    ibuprofen 600 mg oral tablet  -- 1 tab(s) by mouth every 6 hours, As Needed  -- Indication: For Pain    ferrous sulfate 325 mg (65 mg elemental iron) oral tablet  -- 1 tab(s) by mouth 2 times a day  -- Indication: For anemia    ascorbic acid 500 mg oral tablet  -- 1 tab(s) by mouth once a day  -- Indication: For nutrition

## 2022-03-05 NOTE — OB PROVIDER DELIVERY SUMMARY - NSCSDELIVATYPE_OBGYN_ALL_OB
Repeat Posterior Auricular Interpolation Flap Division And Inset Text: Division and inset of the posterior auricular interpolation flap was performed to achieve optimal aesthetic result, restore normal anatomic appearance and avoid distortion of normal anatomy, expedite and facilitate wound healing, achieve optimal functional result and because linear closure either not possible or would produce suboptimal result. The patient was prepped and draped in the usual manner. The pedicle was infiltrated with local anesthesia. The pedicle was sectioned with a #15 blade. The pedicle was de-bulked and trimmed to match the shape of the defect. Hemostasis was achieved. The flap donor site and free margin of the flap were secured with deep buried sutures and the wound edges were re-approximated.

## 2022-03-05 NOTE — OB RN PATIENT PROFILE - NSPRENATALGBS_OBGYN_ALL_OB_START_DATE
[address] Patient Status:  Outpatient   Age/Gender 37year old female MRN [id number]   Location [address] [facility] Attending Edilson Juan MD   [facility] # 0 PCP James De Leon MD       Anesthesia Post-op Note 03-Feb-2022

## 2022-03-05 NOTE — OB NEONATOLOGY/PEDIATRICIAN DELIVERY SUMMARY - NSPEDSNEONOTESA_OBGYN_ALL_OB_FT
Called at to attend a c/s delivery due to LGA and difficult extraction. Baby is  product of a 37.1week gestation born to a G 2 P 1001  37 year old female   Maternal labs include Blood Type B+, HIV neg, RPR NR, Hep B[ - ], GBS +, COvid neg. Maternal history is nonsignificant f . Pregnancy was uncomplicated   ROM at delivery.  Resuscitation included: WDSS and CPAP 5 x 1 min for general cyanosis with an improvement in color.  Apgars were: 8&9.  Admit toNBN .  T

## 2022-03-05 NOTE — DISCHARGE NOTE OB - NS MD DC FALL RISK RISK
For information on Fall & Injury Prevention, visit: [email or web address] OR  [email or web address] OR  https://www.cdc.gov/steadi/patient.html

## 2022-03-05 NOTE — OB PROVIDER H&P - PROBLEM SELECTOR PLAN 1
-Admit l&d. Routine labs  -Repeat    -Fetus: cat 1 tracing, vertex presentation, continuous monitoring  -GBS negative   -NPO   -GBS negative  -2units PRBC on hold for BMI & LGA   -Covid 19 pending for patient  -Consents signed and witnessed by Dr. Tao

## 2022-03-05 NOTE — OB RN TRIAGE NOTE - FALL HARM RISK - UNIVERSAL INTERVENTIONS
Bed in lowest position, wheels locked, appropriate side rails in place/Call bell, personal items and telephone in reach/Instruct patient to call for assistance before getting out of bed or chair/Non-slip footwear when patient is out of bed/[place] to call system/Physically safe environment - no spills, clutter or unnecessary equipment/Purposeful Proactive Rounding/Room/bathroom lighting operational, light cord in reach

## 2022-03-05 NOTE — OB RN DELIVERY SUMMARY - NSSELHIDDEN_OBGYN_ALL_OB_FT
[NS_DeliveryAttending1_OBGYN_ALL_OB_FT:[id number]],[NS_DeliveryRN_OBGYN_ALL_OB_FT:[id number]]

## 2022-03-05 NOTE — OB PROVIDER H&P - NSHPPHYSICALEXAM_GEN_ALL_CORE
Vital Signs Last 24 Hrs  T(C): 37.0 (05 Mar 2022 02:22), Max: 37.0 (05 Mar 2022 02:22)  T(F): 98.6 (05 Mar 2022 02:22), Max: 98.6 (05 Mar 2022 02:22)  HR: 83 (05 Mar 2022 02:25) (83 - 83)  BP: 131/61 (05 Mar 2022 02:25) (131/61 - 131/61)  BP(mean): --  RR: --  SpO2: --    Abdomen: soft, non tender. no guarding or rebound tenderness  SVE: 1/50/-3  TAS: vertex presentation    NST reactive with moderate variability, +Accels  toco: ctx 4-6 minutes

## 2022-03-05 NOTE — OB PROVIDER DELIVERY SUMMARY - NSPROVIDERDELIVERYNOTE_OBGYN_ALL_OB_FT
Liveborn male infant, vertex presentation, apgars . Grossly normal uterus. Bladder adherent to anterior surface of lower uterine segment. Peritoneal adhesions to posterior surface of uterus. Fibrillar placed over top of hysterotomy. EBL  cc. IVF 1700cc. UOP 100cc. RCS at 37 weeks for labor, declining TOLAC   Liveborn male infant, vertex presentation, apgars 8/9, weight 8lb 12oz. Grossly normal uterus. Bladder adherent to anterior surface of lower uterine segment. Peritoneal adhesions to posterior surface of uterus. Fibrillar placed on hysterotomy. EBL 380cc  cc. IVF 1700cc. UOP 100cc. RCS at 37 weeks for labor, declining TOLAC   Liveborn male infant, vertex presentation, apgars 8/9, weight 8lb 12oz. Grossly normal uterus. Bladder adherent to anterior surface of lower uterine segment. Peritoneal adhesions to posterior surface of uterus. Fibrillar placed on hysterotomy. EBL 504cc  cc. IVF 1700cc. UOP 100cc.

## 2022-03-05 NOTE — DISCHARGE NOTE OB - MATERIALS PROVIDED
Vaccinations/[facility]  Screening Program/  Immunization Record/Breastfeeding Log/Bottle Feeding Log/Breastfeeding Mother’s Support Group Information/Guide to Postpartum Care/[facility] Hearing Screen Program/Back To Sleep Handout/Shaken Baby Prevention Handout/Breastfeeding Guide and Packet/Birth Certificate Instructions/Discharge Medication Information for Patients and Families Pocket Guide

## 2022-03-05 NOTE — OB PROVIDER H&P - HISTORY OF PRESENT ILLNESS
38 y/o pt 37.1 weeks  AMA presents with c/o intermittent  lower abdominal pain x1 day. Pt reports pain is mostly on left side. pt reports 8/10 pain. pt denies any lof or bleeding. pt denies n/v/d, fever or chills. pt endorses +Fetal movement.  Patient scheduled for repeat  on 22 for hx of previous ; pt desires repeat . NPO @ 20:00   AP complicated by:  -Obesity; BMI 48  -AMA  -LGA; EFW 22 3904gm    Allergy: Penicillin-hives   PMH:   -Obesity; BMI 48  -Sickle cell trait   -Childhood asthma, no recent attack. no hx of hospitalizations or intubations  PSH:    x1  OB:  Primary  cat 2 tracing/failure to dilate @ 3cm 2014 FT 7#14   GYN:  denies fibroids/cysts/stds  Social hx:  denies etoh/smoking/illicit drugs  Medications:  PNV

## 2022-03-05 NOTE — OB PROVIDER H&P - ASSESSMENT
36 y/o pt  37.1 weeks AMA presents in early labor for repeat    d/w Dr. Tao & Dr. Fraser  AP complicated by:  -Obesity; BMI 48  -AMA  -LGA; EFW 22 3904gm    Plan:  -Admit l&d. Routine labs  -Repeat    -Fetus: cat 1 tracing, vertex presentation, continuous monitoring  -GBS negative   -NPO   -GBS negative  -2units PRBC on hold for BMI & LGA   -Covid 19 pending for patient  -Consents signed and witnessed by Dr. Tao    0305   huddle with Dr. Tao, Dr Fraser, anesthesia, charge RN

## 2022-03-05 NOTE — OB PROVIDER DELIVERY SUMMARY - NSSELHIDDEN_OBGYN_ALL_OB_FT
[NS_DeliveryAttending1_OBGYN_ALL_OB_FT:[id number]=],[NS_DeliveryRN_OBGYN_ALL_OB_FT:[id number]=]

## 2022-03-05 NOTE — DISCHARGE NOTE OB - CARE PROVIDER_API CALL
Juliano Tao)  Obstetrics and Gynecology  [address] [place] [address]  Phone: [phone]  Fax: [phone]  Follow Up Time:

## 2022-03-05 NOTE — OB RN INTRAOPERATIVE NOTE - NSSELHIDDEN_OBGYN_ALL_OB_FT
[NS_DeliveryAttending1_OBGYN_ALL_OB_FT:[id number]=],[NS_DeliveryRN_OBGYN_ALL_OB_FT:[id number]=]

## 2022-03-05 NOTE — DISCHARGE NOTE OB - PATIENT PORTAL LINK FT
You can access the FollowMyHealth Patient Portal offered by [facility] by registering at the following website: [email or web address]. By joining [employer]’s FollowMyHealth portal, you will also be able to view your health information using other applications (apps) compatible with our system.

## 2022-03-05 NOTE — OB RN DELIVERY SUMMARY - NS_SEPSISRSKCALC_OBGYN_ALL_OB_FT
EOS calculated successfully. EOS Risk Factor: 0.5/1000 live births ([facility] national incidence); GA=37w1d; Temp=98.6; ROM=0.017; GBS='Positive'; Antibiotics='No antibiotics or any antibiotics < 2 hrs prior to birth'

## 2022-03-06 LAB
BASOPHILS # BLD AUTO: 0.02 K/UL — SIGNIFICANT CHANGE UP (ref 0–0.2)
BASOPHILS NFR BLD AUTO: 0.2 % — SIGNIFICANT CHANGE UP (ref 0–2)
EOSINOPHIL # BLD AUTO: 0.09 K/UL — SIGNIFICANT CHANGE UP (ref 0–0.5)
EOSINOPHIL NFR BLD AUTO: 1 % — SIGNIFICANT CHANGE UP (ref 0–6)
HCT VFR BLD CALC: 25.7 % — LOW (ref 34.5–45)
HGB BLD-MCNC: 8.6 G/DL — LOW (ref 11.5–15.5)
IANC: 6.21 K/UL — SIGNIFICANT CHANGE UP (ref 1.5–8.5)
IMM GRANULOCYTES NFR BLD AUTO: 0.8 % — SIGNIFICANT CHANGE UP (ref 0–1.5)
LYMPHOCYTES # BLD AUTO: 1 K/UL — SIGNIFICANT CHANGE UP (ref 1–3.3)
LYMPHOCYTES # BLD AUTO: 11.6 % — LOW (ref 13–44)
MCHC RBC-ENTMCNC: 27.9 PG — SIGNIFICANT CHANGE UP (ref 27–34)
MCHC RBC-ENTMCNC: 33.5 GM/DL — SIGNIFICANT CHANGE UP (ref 32–36)
MCV RBC AUTO: 83.4 FL — SIGNIFICANT CHANGE UP (ref 80–100)
MONOCYTES # BLD AUTO: 1.21 K/UL — HIGH (ref 0–0.9)
MONOCYTES NFR BLD AUTO: 14.1 % — HIGH (ref 2–14)
NEUTROPHILS # BLD AUTO: 6.21 K/UL — SIGNIFICANT CHANGE UP (ref 1.8–7.4)
NEUTROPHILS NFR BLD AUTO: 72.3 % — SIGNIFICANT CHANGE UP (ref 43–77)
NRBC # BLD: 0 /100 WBCS — SIGNIFICANT CHANGE UP
NRBC # FLD: 0 K/UL — SIGNIFICANT CHANGE UP
PLATELET # BLD AUTO: 213 K/UL — SIGNIFICANT CHANGE UP (ref 150–400)
RBC # BLD: 3.08 M/UL — LOW (ref 3.8–5.2)
RBC # FLD: 14.6 % — HIGH (ref 10.3–14.5)
WBC # BLD: 8.6 K/UL — SIGNIFICANT CHANGE UP (ref 3.8–10.5)
WBC # FLD AUTO: 8.6 K/UL — SIGNIFICANT CHANGE UP (ref 3.8–10.5)

## 2022-03-06 RX ORDER — OXYCODONE HYDROCHLORIDE 5 MG/1
5 TABLET ORAL
Refills: 0 | Status: DISCONTINUED | OUTPATIENT
Start: 2022-03-06 | End: 2022-03-07

## 2022-03-06 RX ORDER — IBUPROFEN 200 MG
600 TABLET ORAL EVERY 6 HOURS
Refills: 0 | Status: DISCONTINUED | OUTPATIENT
Start: 2022-03-06 | End: 2022-03-07

## 2022-03-06 RX ADMIN — Medication 975 MILLIGRAM(S): at 09:15

## 2022-03-06 RX ADMIN — Medication 600 MILLIGRAM(S): at 18:11

## 2022-03-06 RX ADMIN — Medication 30 MILLIGRAM(S): at 05:35

## 2022-03-06 RX ADMIN — Medication 30 MILLIGRAM(S): at 01:18

## 2022-03-06 RX ADMIN — Medication 30 MILLIGRAM(S): at 01:03

## 2022-03-06 RX ADMIN — OXYCODONE HYDROCHLORIDE 5 MILLIGRAM(S): 5 TABLET ORAL at 21:00

## 2022-03-06 RX ADMIN — HEPARIN SODIUM 10000 UNIT(S): 5000 INJECTION INTRAVENOUS; SUBCUTANEOUS at 12:17

## 2022-03-06 RX ADMIN — Medication 600 MILLIGRAM(S): at 12:45

## 2022-03-06 RX ADMIN — Medication 600 MILLIGRAM(S): at 23:45

## 2022-03-06 RX ADMIN — Medication 975 MILLIGRAM(S): at 08:31

## 2022-03-06 RX ADMIN — Medication 975 MILLIGRAM(S): at 12:45

## 2022-03-06 RX ADMIN — OXYCODONE HYDROCHLORIDE 5 MILLIGRAM(S): 5 TABLET ORAL at 20:56

## 2022-03-06 RX ADMIN — Medication 600 MILLIGRAM(S): at 12:09

## 2022-03-06 RX ADMIN — Medication 30 MILLIGRAM(S): at 05:20

## 2022-03-06 RX ADMIN — Medication 975 MILLIGRAM(S): at 14:55

## 2022-03-06 RX ADMIN — Medication 600 MILLIGRAM(S): at 17:32

## 2022-03-07 VITALS
RESPIRATION RATE: 18 BRPM | HEART RATE: 75 BPM | OXYGEN SATURATION: 100 % | TEMPERATURE: 99 F | DIASTOLIC BLOOD PRESSURE: 59 MMHG | SYSTOLIC BLOOD PRESSURE: 103 MMHG

## 2022-03-07 RX ORDER — ASCORBIC ACID 60 MG
1 TABLET,CHEWABLE ORAL
Qty: 0 | Refills: 0 | DISCHARGE
Start: 2022-03-07

## 2022-03-07 RX ORDER — IBUPROFEN 200 MG
1 TABLET ORAL
Qty: 0 | Refills: 0 | DISCHARGE
Start: 2022-03-07

## 2022-03-07 RX ORDER — FERROUS SULFATE 325(65) MG
325 TABLET ORAL
Refills: 0 | Status: DISCONTINUED | OUTPATIENT
Start: 2022-03-07 | End: 2022-03-07

## 2022-03-07 RX ORDER — FERROUS SULFATE 325(65) MG
1 TABLET ORAL
Qty: 0 | Refills: 0 | DISCHARGE
Start: 2022-03-07

## 2022-03-07 RX ORDER — ASCORBIC ACID 60 MG
500 TABLET,CHEWABLE ORAL DAILY
Refills: 0 | Status: DISCONTINUED | OUTPATIENT
Start: 2022-03-07 | End: 2022-03-07

## 2022-03-07 RX ORDER — ACETAMINOPHEN 500 MG
3 TABLET ORAL
Qty: 0 | Refills: 0 | DISCHARGE
Start: 2022-03-07

## 2022-03-07 RX ADMIN — Medication 975 MILLIGRAM(S): at 09:09

## 2022-03-07 RX ADMIN — Medication 600 MILLIGRAM(S): at 00:17

## 2022-03-07 RX ADMIN — Medication 600 MILLIGRAM(S): at 18:47

## 2022-03-07 RX ADMIN — Medication 975 MILLIGRAM(S): at 15:17

## 2022-03-07 RX ADMIN — HEPARIN SODIUM 10000 UNIT(S): 5000 INJECTION INTRAVENOUS; SUBCUTANEOUS at 00:26

## 2022-03-07 RX ADMIN — Medication 600 MILLIGRAM(S): at 12:10

## 2022-03-07 RX ADMIN — Medication 500 MILLIGRAM(S): at 12:10

## 2022-03-07 RX ADMIN — Medication 325 MILLIGRAM(S): at 18:47

## 2022-03-07 RX ADMIN — Medication 600 MILLIGRAM(S): at 05:13

## 2022-03-07 RX ADMIN — Medication 600 MILLIGRAM(S): at 05:52

## 2022-03-07 NOTE — PROGRESS NOTE ADULT - ASSESSMENT
Assessment and Plan  POD #2 s/p R/C/S. AMA, Asthma, Anemia, BMI 48.    Her pain is well controlled.   She is tolerating a regular diet and passing flatus.   Denies N/V. Denies CP/SOB/lightheadedness/dizziness.   She is ambulating without difficulty.   Voiding spontaneously.    Patient is stable and doing well post-operatively.    - Continue regular diet.  - Increase ambulation.  - Continue motrin, tylenol, oxycodone PRN for pain control.   - Continue taking Iron and Vit C for anemia  -Encourage breastfeeding.  -Incisional care and PO instructions reviewed.     Follow up @ [facility] in 2 weeks for incision check visit  [phone]    Discussed with MD Rebeca Lee  
A/P: 36yo  POD#1 s/p rLTCS. Patient is stable and doing well post-operatively.     - Continue regular diet.  - Increase ambulation, SCDs when not ambulating, Heparin for DVT ppx  - Continue motrin, tylenol, oxycodone PRN for pain control  - Hct downtrend 30.1->25.7 appropriate for , VSS    MAURY Curran PGY1

## 2022-03-07 NOTE — PROGRESS NOTE ADULT - SUBJECTIVE AND OBJECTIVE BOX
OB Progress Note:  Delivery, POD#1    S: 36yo  POD#1 s/p rLTCS. Her pain is well controlled. She is tolerating a regular diet and passing flatus. Denies N/V. Denies CP/SOB/lightheadedness/dizziness. She is ambulating without difficulty. Voiding spontaneously.     O:   Vital Signs Last 24 Hrs  T(C): 36.8 (06 Mar 2022 05:10), Max: 37.2 (05 Mar 2022 14:30)  T(F): 98.3 (06 Mar 2022 05:10), Max: 99 (05 Mar 2022 14:30)  HR: 78 (06 Mar 2022 05:10) (63 - 79)  BP: 103/54 (06 Mar 2022 05:10) (103/51 - 143/54)  BP(mean): 72 (05 Mar 2022 14:30) (70 - 86)  RR: 18 (06 Mar 2022 05:10) (10 - 18)  SpO2: 100% (06 Mar 2022 05:10) (98% - 100%)    Labs:  Blood type: B Positive  Rubella IgG: RPR: Negative                          8.6<L>   8.60 >-----------< 213    (  @ 07:20 )             25.7<L>                        10.3<L>   7.63 >-----------< 256    (  @ 04:36 )             30.1<L>                  acetaminophen     Tablet .. 975 milliGRAM(s) Oral <User Schedule>  diphenhydrAMINE 25 milliGRAM(s) Oral every 6 hours PRN  diphtheria/tetanus/pertussis (acellular) Vaccine (ADAcel) 0.5 milliLiter(s) IntraMuscular once  heparin   Injectable 36415 Unit(s) SubCutaneous every 12 hours  ibuprofen  Tablet. 600 milliGRAM(s) Oral every 6 hours  influenza   Vaccine 0.5 milliLiter(s) IntraMuscular once  lactated ringers. 1000 milliLiter(s) IV Continuous <Continuous>  lactated ringers. 1000 milliLiter(s) IV Continuous <Continuous>  lactated ringers. 1000 milliLiter(s) IV Continuous <Continuous>  lanolin Ointment 1 Application(s) Topical every 6 hours PRN  magnesium hydroxide Suspension 30 milliLiter(s) Oral two times a day PRN  oxyCODONE    IR 5 milliGRAM(s) Oral every 3 hours PRN  oxyCODONE    IR 5 milliGRAM(s) Oral once PRN  oxytocin Infusion 333.333 milliUNIT(s)/Min IV Continuous <Continuous>  simethicone 80 milliGRAM(s) Chew every 4 hours PRN      PE:  General: NAD  CV: RRR  Pulm: CTAB  Abdomen: Mildly distended, appropriately tender, incision c/d/i.  Extremities: No calf tenderness, no pitting edema    
POST OP DAY  1  s/p   SECTION    SUBJECTIVE:  I'm ok.    PAIN SCALE SCORE: [x] Refer to charted pain scores    THERAPY:  [ x ] Spinal morphine   [  ] Epidural morphine   [  ] IV PCA Hydromorphone 1 mg/ml    OBJECTIVE:  Comfortable Appearing    SEDATION SCORE:	  [ x ] Alert	    [  ] Drowsy        [  ] Arousable	[  ] Asleep	[  ] Unresponsive    Side Effects:	  [  ] None	     [  ] Nausea        [  ] Pruritus        [  ] Weakness   [  ] Numbness: buttock numbness, see post-op note        ASSESSMENT/ PLAN   [   ] Discontinue         [  ] Continue    [ x ] Change to prn Analgesics as per primary service.    DOCUMENTATION & VERIFICATION OF CURRENT MEDS [ x ] Done    COMMENTS: No Headache.  
ANESTHESIA POSTOP CHECK    37y Female POSTOP DAY 1 s/p Repeat  under spinal anesthesia.  Patient states that she still has some numbness in her buttocks, but that it has been wearing off since yesterday. She denies saddle numbness or any weakness, numbness/paresthesias on bilateral legs, and has been able to ambulate without difficulty. i advised her that the numbness is likely to resolve as it has been resolving already, and to let her primary team, or anesthesia know if it persists    NO APPARENT ANESTHESIA COMPLICATIONS      
Post-Operative Note, C/S  She is a  37y woman who is now post-operative day: 2    Subjective:  The patient feels well.  She is ambulating.   She is tolerating regular diet.  She denies nausea and vomiting; denies fever.  She is voiding.  Her pain is controlled; incisional pain is appropriate.  She reports normal postpartum bleeding.  She is breastfeeding.  She is formula feeding.    Physical exam:    Vital Signs Last 24 Hrs  T(C): 36.7 (07 Mar 2022 05:39), Max: 36.9 (06 Mar 2022 21:57)  T(F): 98 (07 Mar 2022 05:39), Max: 98.4 (06 Mar 2022 21:57)  HR: 77 (07 Mar 2022 05:39) (77 - 83)  BP: 117/57 (07 Mar 2022 05:39) (95/52 - 118/53)  BP(mean): --  RR: 18 (07 Mar 2022 05:39) (18 - 18)  SpO2: 100% (07 Mar 2022 05:39) (99% - 100%)    Gen: NAD  Breast: Soft, nontender, not engorged.  Abdomen: Soft, nontender, no distension , firm uterine fundus at umbilicus.  Incision: C/D/I.  Pelvic: Normal lochia noted  Ext: No calf tenderness    LABS:                        8.6    8.60  )-----------( 213      ( 06 Mar 2022 07:20 )             25.7       Rubella status:     Allergies    penicillins (Hives; Rash)    Intolerances      MEDICATIONS  (STANDING):  acetaminophen     Tablet .. 975 milliGRAM(s) Oral <User Schedule>  ascorbic acid 500 milliGRAM(s) Oral daily  diphtheria/tetanus/pertussis (acellular) Vaccine (ADAcel) 0.5 milliLiter(s) IntraMuscular once  ferrous    sulfate 325 milliGRAM(s) Oral two times a day  heparin   Injectable 99977 Unit(s) SubCutaneous every 12 hours  ibuprofen  Tablet. 600 milliGRAM(s) Oral every 6 hours  influenza   Vaccine 0.5 milliLiter(s) IntraMuscular once  lactated ringers. 1000 milliLiter(s) (125 mL/Hr) IV Continuous <Continuous>  lactated ringers. 1000 milliLiter(s) (75 mL/Hr) IV Continuous <Continuous>    MEDICATIONS  (PRN):  diphenhydrAMINE 25 milliGRAM(s) Oral every 6 hours PRN Pruritus  lanolin Ointment 1 Application(s) Topical every 6 hours PRN Sore Nipples  magnesium hydroxide Suspension 30 milliLiter(s) Oral two times a day PRN Constipation  oxyCODONE    IR 5 milliGRAM(s) Oral every 3 hours PRN Moderate to Severe Pain (4-10)  oxyCODONE    IR 5 milliGRAM(s) Oral once PRN Moderate to Severe Pain (4-10)  simethicone 80 milliGRAM(s) Chew every 4 hours PRN Gas

## 2022-06-30 NOTE — OB PROVIDER H&P - NSHOSPITALIZATION_OBGYN_ALL_OB
Physical Therapy Visit    Referred by: Trinity Lee MD; Medical Diagnosis (from order):    Diagnosis Information      Diagnosis    724.2, 338.29 (ICD-9-CM) - M54.50, G89.29 (ICD-10-CM) - Chronic bilateral low back pain without sciatica              Visit: 5    Visit Type: Daily Treatment Note    SUBJECTIVE                                                                                                               Last night she tweaked low back region taking out recycling. Pain is currently 6/10 left SI region/low back. Patient feels ultrasound gave longer lasting relief versus IFC.   Pain / Symptoms:  Pain rating (out of 10): Current: 6   Location: Low back (SI joint)    OBJECTIVE                                                                                                                            TREATMENT                                                                                                                  Therapeutic Exercise:  Bridges   MET for left anterior ilium x 10 (push down left leg and right knee into hands)      Manual Therapy:  Left leg short   Leg pulls   Pubic shotgun   Repeat leg pulls   Legs appear even   soft tissue mobilization to bilateral  low back/SI and proximal glut region in prone position           Skilled input: verbal instruction/cues    Writer verbally educated and received verbal consent for hand placement, positioning of patient, and techniques to be performed today from patient for hand placement and palpation for techniques, clothing adjustments for techniques and modality application as described above and how they are pertinent to the patient's plan of care.    Home Exercise Program/Education Materials: Access Code: [email or web address]  URL: [email or web address]  Date: 06/15/2022  Prepared by: Rohith Antoine    Exercises  · Hooklying Sacroiliac Joint Isometric - 2 x daily - 7 x weekly - 3 sets - 1 reps - 10 hold  · Supine Bridge - 2 x daily - 7 x  weekly - 3 sets - 10 reps  · Supine Double Knee to Chest - 2 x daily - 7 x weekly - 1 sets - 2 reps - 30 hold    Ultrasound (58374)  Location: left SI region   Position: prone  Duty Cycle: 100%  Frequency: 1 Mhz  Intensity (w/cm2): 1.4  Duration: 10 minutes  Results: decreased pain  Reaction: no adverse reaction to treatment      ASSESSMENT                                                                                                             Patient with increased pain this date after doing recycling last night. Left leg appears shorter than left, gradually able to equal out. Tenderness left SI, across low back left more than right and glut region. Pain decreased to 3/10 end of session.   Patient Education:   Results of above outlined education: Verbalizes understanding      PLAN                                                                                                                           Suggestions for next session as indicated: Progress per plan of care  recheck Sacroiliac joint, right hip has bursitis,  core strength as able, monitor effects of IFC, manual therapy as needed.        Roc Worthy was present and supervised this patient visit. Notes were reviewed and care agreed upon.     Therapy procedure time and total treatment time can be found documented on the Time Entry flowsheet   No

## 2023-01-12 NOTE — OB RN PATIENT PROFILE - EXTENSIONS OF SELF_ADULT
Appt scheduled
Patient is experiencing extreme pain in the left leg in the middle of the night. Pain is from the toe clear up to the groin, the entire leg. She is interested in trying a Duragesic pain patch for relief from this pain. This method of pain relief was referred to her by a nurse in the ICU at the hospital.  Patient said to tell Dr. Gal Nation that She \"Is very insistent on trying the patch\". Please contact patient to advise.
This is an exceptionally strong opioid medication. I cannot just start the patch. We have to have some sort of visit. I am willing to do a phone or video visit.
None

## 2024-08-21 NOTE — PROGRESS NOTE ADULT - ASSESSMENT
36F with no PMHx, currently 5 weeks pregnant as per LMP, presented for facial cellulitis after dental procedure. No abscess on imaging, started on IV Clindamycin. Facial swelling improving.  Introduction Text (Please End With A Colon): The following procedure was deferred: Detail Level: Detailed Size Of Lesion In Cm (Optional): 0 Procedure To Be Performed At Next Visit: Liquid nitrogen Instructions (Optional): Patient was instructed to comeback if lesion is present mid September

## 2024-11-16 NOTE — DISCHARGE NOTE OB - PAIN IN THE CALVES OF YOUR LEGS
Lab Results   Component Value Date    EGFR 9 11/16/2024    EGFR 7 11/15/2024    EGFR 8 11/14/2024    CREATININE 5.35 (H) 11/16/2024    CREATININE 6.92 (H) 11/15/2024    CREATININE 5.99 (H) 11/14/2024      Statement Selected

## 2025-05-20 NOTE — ED PROVIDER NOTE - NORMAL, MLM
Copied from CRM #[id number]. Topic: MW Messaging - MW Patient Request  >> May 20, 2025  3:16 PM Sadia ROBLERO wrote:  --DO NOT REPLY - Sent from PACT - If sent to wrong pool, reroute to P ECO Reroute pool --    General Message: Mari calling from [facility] for a update on the home health orders that require a signature that was sent over on 05/02/2025 , 05/07/2025 & 05/09/2025    Callback #: [phone]  Can a detailed message be left? Yes - Voicemail   Caller has been advised this message will be addressed within:2-3 business days [routine]         I have personally seen, examined, and participated in the care of this patient. I have reviewed all pertinent clinical information, including history, physical exam, plan, and the Resident's note and agree except as noted. babar all pertinent systems normal

## 2025-07-08 NOTE — OB RN TRIAGE NOTE - SUICIDE SCREENING QUESTION 1
[de-identified] : LETTY LEBRON is a 64 year year old female who presents for:  1. Moles throughout body, none changing or symptomatic. due for TBSE.     History of previous BCC many years ago, does not remember location. +FH NMSC. - BCC s/p Mohs w/ advancement flap L nasal sidewall (Nov 2022) 
No

## (undated) DEVICE — DRSG DERMABOND PRINEO 22CM